# Patient Record
Sex: FEMALE | Race: WHITE | NOT HISPANIC OR LATINO | Employment: OTHER | URBAN - NONMETROPOLITAN AREA
[De-identification: names, ages, dates, MRNs, and addresses within clinical notes are randomized per-mention and may not be internally consistent; named-entity substitution may affect disease eponyms.]

---

## 2017-03-01 ENCOUNTER — LAB (OUTPATIENT)
Dept: LAB | Facility: HOSPITAL | Age: 62
End: 2017-03-01
Attending: OTOLARYNGOLOGY

## 2017-03-01 DIAGNOSIS — E04.1 THYROID NODULE: ICD-10-CM

## 2017-03-01 LAB — TSH SERPL DL<=0.05 MIU/L-ACNC: 2.86 MIU/ML (ref 0.47–4.68)

## 2017-03-01 PROCEDURE — 36415 COLL VENOUS BLD VENIPUNCTURE: CPT

## 2017-03-01 PROCEDURE — 84443 ASSAY THYROID STIM HORMONE: CPT | Performed by: OTOLARYNGOLOGY

## 2017-03-09 ENCOUNTER — OFFICE VISIT (OUTPATIENT)
Dept: OTOLARYNGOLOGY | Facility: CLINIC | Age: 62
End: 2017-03-09

## 2017-03-09 VITALS
HEART RATE: 100 BPM | WEIGHT: 293 LBS | RESPIRATION RATE: 20 BRPM | SYSTOLIC BLOOD PRESSURE: 160 MMHG | DIASTOLIC BLOOD PRESSURE: 86 MMHG | BODY MASS INDEX: 44.41 KG/M2 | TEMPERATURE: 97.9 F | HEIGHT: 68 IN

## 2017-03-09 DIAGNOSIS — E04.1 THYROID NODULE: Primary | ICD-10-CM

## 2017-03-09 DIAGNOSIS — E04.2 MULTINODULAR GOITER: ICD-10-CM

## 2017-03-09 PROCEDURE — 99214 OFFICE O/P EST MOD 30 MIN: CPT | Performed by: PHYSICIAN ASSISTANT

## 2017-03-09 RX ORDER — ASPIRIN 325 MG
325 TABLET ORAL DAILY
COMMUNITY

## 2017-03-09 RX ORDER — IBUPROFEN 800 MG/1
TABLET ORAL
Refills: 3 | COMMUNITY
Start: 2016-12-16

## 2017-03-09 NOTE — PROGRESS NOTES
Patient Care Team:  Wyatt Simmons MD as PCP - General  Wyatt Simmons MD as PCP - Family Medicine  Andrez Barreto MD as Consulting Physician (Otolaryngology)    Chief complaint : Follow up thyroid problems    Subjective     Nicole Song is a 61 y.o. female who presents for follow up of thyroid problems. She has had no current complaints. She denies throat pain, globus sensation, voice change or dysphagia.    Review of Systems  Review of Systems   Constitutional: Negative for activity change, appetite change, chills, diaphoresis, fatigue, fever and unexpected weight change.   HENT: Negative for congestion, dental problem, drooling, ear discharge, ear pain, facial swelling, hearing loss, mouth sores, nosebleeds, postnasal drip, rhinorrhea, sinus pressure, sneezing, sore throat, tinnitus, trouble swallowing and voice change.    Eyes: Negative.    Respiratory: Negative.    Cardiovascular: Negative.    Gastrointestinal: Negative.    Endocrine: Negative.    Skin: Negative.    Allergic/Immunologic: Negative for environmental allergies, food allergies and immunocompromised state.   Neurological: Negative.    Hematological: Negative.    Psychiatric/Behavioral: Negative.        History  Past Medical History   Diagnosis Date   • Asthma    • DVT (deep venous thrombosis)      Ankle and heart   • Thyroid nodule      Uncertain Behavior     Past Surgical History   Procedure Laterality Date   • Foot surgery     • Vitrectomy     • Eye surgery       Family History   Problem Relation Age of Onset   • Cancer Other      Family History of Breast Cancer     Social History   Substance Use Topics   • Smoking status: Never Smoker   • Smokeless tobacco: None   • Alcohol use No       Current Outpatient Prescriptions:   •  aspirin 325 MG tablet, Take 325 mg by mouth Daily., Disp: , Rfl:   •  ibuprofen (ADVIL,MOTRIN) 800 MG tablet, TAKE 1 TABLET BY MOUTH EVERY 6 HOURS AS NEEDED FOR PAIN, Disp: , Rfl: 3  Allergies:   Sulfa antibiotics; Pineapple; Citric acid; Fluocinolone; and Other    Objective     Vital Signs  Temp:  [97.9 °F (36.6 °C)] 97.9 °F (36.6 °C)  Heart Rate:  [100] 100  Resp:  [20] 20  BP: (160)/(86) 160/86    Physical Exam:  CONSTITUTIONAL: well nourished, well-developed, alert, oriented, in no acute distress   COMMUNICATION AND VOICE: able to communicate normally, normal voice quality  HEAD: normocephalic, no lesions, atraumatic, no tenderness, no masses   FACE: appearance normal, no lesions, no tenderness, no deformities, facial motion symmetric  EYES: ocular motility normal, eyelids normal, orbits normal, no proptosis, conjunctiva normal , pupils equal, round   EARS:  Hearing: hearing to conversational voice intact bilaterally   External Ears: normal bilaterally, no lesions  NOSE:  External Nose: external nasal structure normal, no tenderness on palpation, no nasal discharge, no lesions, no evidence of trauma, nostrils patent   ORAL:  Lips: upper and lower lips without lesion   NECK:  Inspection and Palpation: neck appearance normal, no masses or tenderness  THYROID: thyromegaly present with right inferior lobe firmness with large stable nodule noted on ultrasound, nontender and midline  CHEST/RESPIRATORY: normal respiratory effort   CARDIOVASCULAR: no cyanosis or edema   NEUROLOGICAL/PSYCHIATRIC: oriented to time, place and person, mood normal, affect appropriate, CN II-XII intact grossly    THYROID TESTING: TSH was normal. Thyroid ultrasound showed stable nodules.    TSH 0.470 - 4.680 mIU/mL 2.860         Assessment   1. Thyroid nodule    2. Multinodular goiter        Plan   Medical and surgical options were discussed including observation vs ultrasound guided needle biopsy vs thyroidectomy. Risks, benefits and alternatives were discussed and questions were answered. After considering the options, the patient decided to proceed continued observation.     --------TESTING:--------  TSH (57737) in 1 year  Repeat  ultrasound of the thyroid in 1 year    ----INSTRUCTIONS----  Call for sooner evaluation if increasing size of the thyroid or nodules, increasing dysphagia, lymphadenopathy, neck tightness or other thyroid problems.     -----FOLLOW UP-----  Follow up in 1 year    SCOUT Wilson  03/09/17  10:19 AM

## 2017-03-09 NOTE — PROGRESS NOTES
YOB: 1955  Location: [facility]  Location Address: [address] [place] [address], [facility], [address] [place] [address]  Location Phone: [phone]    Chief Complaint   Patient presents with   • Follow-up     Thyroid       History of Present Illness  Nicole Song is a 60 y.o. female. Nicole Song is here for follow up of ENT complaints. The patient has had problems with thyroid nodules The symptoms are not localized to a particular location. The patient has had stable symptoms. The symptoms have been present for the last several months .. Patient denies dysphagia today but other symptoms are stable. She has had recent abdominal surgery. Patient's main complaint today is ear pain and pressure on the right.           TSH 2.23 -16                 TSH 2.860           Past Medical History   Diagnosis Date   • Asthma    • DVT (deep venous thrombosis)      Ankle and heart   • Thyroid nodule      Uncertain Behavior       Past Surgical History   Procedure Laterality Date   • Foot surgery     • Vitrectomy     • Eye surgery           Current Outpatient Prescriptions:   •  aspirin 325 MG tablet, Take 325 mg by mouth Daily., Disp: , Rfl:   •  ibuprofen (ADVIL,MOTRIN) 800 MG tablet, TAKE 1 TABLET BY MOUTH EVERY 6 HOURS AS NEEDED FOR PAIN, Disp: , Rfl: 3    Sulfa antibiotics; Pineapple; Citric acid; Fluocinolone; and Other    Family History   Problem Relation Age of Onset   • Cancer Other      Family History of Breast Cancer       Social History     Social History   • Marital status: Single     Spouse name: N/A   • Number of children: N/A   • Years of education: N/A     Occupational History   • Not on file.     Social History Main Topics   • Smoking status: Never Smoker   • Smokeless tobacco: Not on file   • Alcohol use No   • Drug use: Not on file   • Sexual activity: Not on file     Other Topics Concern   • Not on file     Social History Narrative       Review of Systems    Vitals:    17 1000   BP: 160/86      Pulse: 100   Resp: 20   Temp: 97.9 °F (36.6 °C)       Objective     Physical Exam  CONSTITUTIONAL: well nourished, alert, oriented, in no acute distress     COMMUNICATION AND VOICE: able to communicate normally, normal voice quality    HEAD: normocephalic, no lesions, atraumatic, no tenderness, no masses     FACE: appearance normal, no lesions, no tenderness, no deformities, facial motion symmetric    SALIVARY GLANDS: parotid glands with no tenderness, no swelling, no masses, submandibular glands with normal size, nontender    EYES: ocular motility normal, eyelids normal, orbits normal, no proptosis, conjunctiva normal , pupils equal, round     EARS:  Hearing: response to conversational voice normal bilaterally   External Ears: auricles without lesions  Otoscopic: tympanic membrane appearance normal, no lesions, no perforation, normal mobility, no fluid    NOSE:  External Nose: structure normal, no tenderness on palpation, no nasal discharge, no lesions, no evidence of trauma, nostrils patent   Intranasal Exam: nasal mucosa normal, vestibule within normal limits, inferior turbinate normal, nasal septum midline   Nasopharynx:     ORAL:  Lips: upper and lower lips without lesion   Teeth: dentition within normal limits for age   Gums: gingivae healthy   Oral Mucosa: oral mucosa normal, no mucosal lesions   Floor of Mouth: Warthin’s duct patent, mucosa normal  Tongue: lingual mucosa normal without lesions, normal tongue mobility   Palate: soft and hard palates with normal mucosa and structure  Oropharynx: oropharyngeal mucosa normal    HYPOPHARYNX:   LARYNX: epiglottis and arytenoid cartilage within normal limits, vocal cord mucosa normal with normal mobility     NECK: neck appearance normal, no mass,  noted without erythema or tenderness    THYROID: no overt thyromegaly, no tenderness, nodules or mass present on palpation, position midline     LYMPH NODES: no lymphadenopathy    CHEST/RESPIRATORY: respiratory effort  normal, normal breath sounds     CARDIOVASCULAR: rate and rhythm normal, extremities without cyanosis or edema      NEUROLOGIC/PSYCHIATRIC: oriented to time, place and person, mood normal, affect appropriate, CN II-XII intact grossly    Assessment/Plan   {Assess/PlanSmartLinks:45395}  * Surgery not found *  No orders of the defined types were placed in this encounter.    No Follow-up on file.       There are no Patient Instructions on file for this visit.

## 2017-05-01 ENCOUNTER — TELEPHONE (OUTPATIENT)
Dept: OBGYN | Age: 62
End: 2017-05-01

## 2017-05-24 ENCOUNTER — OFFICE VISIT (OUTPATIENT)
Dept: OBGYN | Age: 62
End: 2017-05-24
Payer: COMMERCIAL

## 2017-05-24 VITALS
HEART RATE: 101 BPM | HEIGHT: 68 IN | DIASTOLIC BLOOD PRESSURE: 82 MMHG | WEIGHT: 293 LBS | BODY MASS INDEX: 44.41 KG/M2 | SYSTOLIC BLOOD PRESSURE: 157 MMHG

## 2017-05-24 DIAGNOSIS — B37.2 CANDIDAL INTERTRIGO: ICD-10-CM

## 2017-05-24 DIAGNOSIS — Z12.72 SCREENING FOR VAGINAL CANCER: ICD-10-CM

## 2017-05-24 DIAGNOSIS — Z01.419 WELL WOMAN EXAM WITH ROUTINE GYNECOLOGICAL EXAM: Primary | ICD-10-CM

## 2017-05-24 PROCEDURE — 99396 PREV VISIT EST AGE 40-64: CPT | Performed by: OBSTETRICS & GYNECOLOGY

## 2017-05-24 RX ORDER — NYSTATIN 100000 U/G
CREAM TOPICAL
Qty: 1 TUBE | Refills: 0 | Status: SHIPPED | OUTPATIENT
Start: 2017-05-24 | End: 2019-03-08

## 2017-05-24 RX ORDER — FLUCONAZOLE 100 MG/1
100 TABLET ORAL ONCE
Qty: 1 TABLET | Refills: 0 | Status: SHIPPED | OUTPATIENT
Start: 2017-05-24 | End: 2017-05-24

## 2017-05-24 ASSESSMENT — ENCOUNTER SYMPTOMS
EYES NEGATIVE: 1
RESPIRATORY NEGATIVE: 1
GASTROINTESTINAL NEGATIVE: 1

## 2017-05-28 ASSESSMENT — ENCOUNTER SYMPTOMS: ALLERGIC/IMMUNOLOGIC NEGATIVE: 1

## 2018-02-27 ENCOUNTER — LAB (OUTPATIENT)
Dept: LAB | Facility: HOSPITAL | Age: 63
End: 2018-02-27

## 2018-02-27 DIAGNOSIS — E04.1 THYROID NODULE: ICD-10-CM

## 2018-02-27 LAB — TSH SERPL DL<=0.05 MIU/L-ACNC: 1.31 MIU/ML (ref 0.47–4.68)

## 2018-02-27 PROCEDURE — 36415 COLL VENOUS BLD VENIPUNCTURE: CPT

## 2018-02-27 PROCEDURE — 84443 ASSAY THYROID STIM HORMONE: CPT | Performed by: PHYSICIAN ASSISTANT

## 2018-03-08 ENCOUNTER — OFFICE VISIT (OUTPATIENT)
Dept: OTOLARYNGOLOGY | Facility: CLINIC | Age: 63
End: 2018-03-08

## 2018-03-08 ENCOUNTER — CLINICAL SUPPORT NO REQUIREMENTS (OUTPATIENT)
Dept: OTOLARYNGOLOGY | Facility: CLINIC | Age: 63
End: 2018-03-08

## 2018-03-08 VITALS
BODY MASS INDEX: 44.41 KG/M2 | SYSTOLIC BLOOD PRESSURE: 150 MMHG | TEMPERATURE: 98.3 F | WEIGHT: 293 LBS | DIASTOLIC BLOOD PRESSURE: 90 MMHG | HEIGHT: 68 IN

## 2018-03-08 DIAGNOSIS — E04.1 THYROID NODULE: ICD-10-CM

## 2018-03-08 DIAGNOSIS — E04.2 MULTINODULAR GOITER: Primary | ICD-10-CM

## 2018-03-08 PROCEDURE — 99213 OFFICE O/P EST LOW 20 MIN: CPT | Performed by: PHYSICIAN ASSISTANT

## 2018-03-08 PROCEDURE — 76536 US EXAM OF HEAD AND NECK: CPT | Performed by: PHYSICIAN ASSISTANT

## 2018-03-08 NOTE — PROGRESS NOTES
Patient Care Team:  Wyatt Simmons MD as PCP - General  Wyatt Simmons MD as PCP - Family Medicine  Andrez Barreto MD as Consulting Physician (Otolaryngology)    Chief complaint : Follow up thyroid problems    Subjective     Nicole Song is a 62 y.o. female who presents for follow up of thyroid problems. She has had no current complaints. She denies throat pain, globus sensation, voice change or dysphagia.    Review of Systems  Review of Systems   Constitutional: Negative for activity change, appetite change, chills, diaphoresis, fatigue, fever and unexpected weight change.   HENT: Negative for congestion, dental problem, drooling, ear discharge, ear pain, facial swelling, hearing loss, mouth sores, nosebleeds, postnasal drip, rhinorrhea, sinus pressure, sneezing, sore throat, tinnitus, trouble swallowing and voice change.    Eyes: Negative.    Respiratory: Negative.    Cardiovascular: Negative.    Gastrointestinal: Negative.    Endocrine: Negative.    Skin: Negative.    Allergic/Immunologic: Negative for environmental allergies, food allergies and immunocompromised state.   Neurological: Negative.    Hematological: Negative.    Psychiatric/Behavioral: Negative.        History  Past Medical History:   Diagnosis Date   • Asthma    • DVT (deep venous thrombosis)     Ankle and heart   • Thyroid nodule     Uncertain Behavior     Past Surgical History:   Procedure Laterality Date   • EYE SURGERY     • FOOT SURGERY     • VITRECTOMY       Family History   Problem Relation Age of Onset   • Cancer Other      Family History of Breast Cancer     Social History   Substance Use Topics   • Smoking status: Never Smoker   • Smokeless tobacco: Never Used   • Alcohol use No       Current Outpatient Prescriptions:   •  aspirin 325 MG tablet, Take 325 mg by mouth Daily., Disp: , Rfl:   •  ibuprofen (ADVIL,MOTRIN) 800 MG tablet, TAKE 1 TABLET BY MOUTH EVERY 6 HOURS AS NEEDED FOR PAIN, Disp: , Rfl: 3  Allergies:   Sulfa antibiotics; Pineapple; Citric acid; Fluocinolone; and Other    Objective     Vital Signs  Temp:  [98.3 °F (36.8 °C)] 98.3 °F (36.8 °C)  BP: (150)/(90) 150/90    Physical Exam:  CONSTITUTIONAL: well nourished, well-developed, alert, oriented, in no acute distress   COMMUNICATION AND VOICE: able to communicate normally, normal voice quality  HEAD: normocephalic, no lesions, atraumatic, no tenderness, no masses   FACE: appearance normal, no lesions, no tenderness, no deformities, facial motion symmetric  EYES: ocular motility normal, eyelids normal, orbits normal, no proptosis, conjunctiva normal , pupils equal, round   EARS:  Hearing: hearing to conversational voice intact bilaterally   External Ears: normal bilaterally, no lesions  NOSE:  External Nose: external nasal structure normal, no tenderness on palpation, no nasal discharge, no lesions, no evidence of trauma, nostrils patent   ORAL:  Lips: upper and lower lips without lesion   NECK:  Inspection and Palpation: neck appearance normal, no masses or tenderness  THYROID: thyromegaly present with right inferior lobe firmness with large stable nodule noted on ultrasound, nontender and midline  CHEST/RESPIRATORY: normal respiratory effort   CARDIOVASCULAR: no cyanosis or edema   NEUROLOGICAL/PSYCHIATRIC: oriented to time, place and person, mood normal, affect appropriate, CN II-XII intact grossly    THYROID TESTING: TSH was normal. Thyroid ultrasound showed stable nodules.        TSH 2-27-18 1.310    Assessment   1. Multinodular goiter        Plan   Medical and surgical options were discussed including observation vs ultrasound guided needle biopsy vs thyroidectomy. Risks, benefits and alternatives were discussed and questions were answered. After considering the options, the patient decided to proceed continued observation.     --------TESTING:--------  TSH (78872) in 1 year  Repeat ultrasound of the thyroid in 1 year    ----INSTRUCTIONS----  Call for sooner  evaluation if increasing size of the thyroid or nodules, increasing dysphagia, lymphadenopathy, neck tightness or other thyroid problems.     -----FOLLOW UP-----  Follow up in 1 year    SCOUT Wilson  03/08/18  10:54 AM

## 2018-03-08 NOTE — PATIENT INSTRUCTIONS
Will get thyroid ultrasound and TSH in one year. Follow-up sooner if symptoms develop.      MyPlate from [employer]  The general, healthful diet is based on the 2010 Dietary Guidelines for Americans. The amount of food you need to eat from each food group depends on your age, sex, and level of physical activity and can be individualized by a dietitian. Go to ChooseMyPlate.gov for more information.  What do I need to know about the MyPlate plan?  · Enjoy your food, but eat less.  · Avoid oversized portions.  ¨ ½ of your plate should include fruits and vegetables.  ¨ ¼ of your plate should be grains.  ¨ ¼ of your plate should be protein.  Grains   · Make at least half of your grains whole grains.  · For a 2,000 calorie daily food plan, eat 6 oz every day.  · 1 oz is about 1 slice bread, 1 cup cereal, or ½ cup cooked rice, cereal, or pasta.  Vegetables   · Make half your plate fruits and vegetables.  · For a 2,000 calorie daily food plan, eat 2½ cups every day.  · 1 cup is about 1 cup raw or cooked vegetables or vegetable juice or 2 cups raw leafy greens.  Fruits   · Make half your plate fruits and vegetables.  · For a 2,000 calorie daily food plan, eat 2 cups every day.  · 1 cup is about 1 cup fruit or 100% fruit juice or ½ cup dried fruit.  Protein   · For a 2,000 calorie daily food plan, eat 5½ oz every day.  · 1 oz is about 1 oz meat, poultry, or fish, ¼ cup cooked beans, 1 egg, 1 Tbsp peanut butter, or ½ oz nuts or seeds.  Dairy   · Switch to fat-free or low-fat (1%) milk.  · For a 2,000 calorie daily food plan, eat 3 cups every day.  · 1 cup is about 1 cup milk or yogurt or soy milk (soy beverage), 1½ oz natural cheese, or 2 oz processed cheese.  Fats, Oils, and Empty Calories   · Only small amounts of oils are recommended.  · Empty calories are calories from solid fats or added sugars.  · Compare sodium in foods like soup, bread, and frozen meals. Choose the foods with lower numbers.  · Drink water instead of sugary  drinks.  What foods can I eat?  Grains   Whole grains such as whole wheat, quinoa, millet, and bulgur. Bread, rolls, and pasta made from whole grains. Brown or wild rice. Hot or cold cereals made from whole grains and without added sugar.  Vegetables   All fresh vegetables, especially fresh red, dark green, or orange vegetables. Peas and beans. Low-sodium frozen or canned vegetables prepared without added salt. Low-sodium vegetable juices.  Fruits   All fresh, frozen, and dried fruits. Canned fruit packed in water or fruit juice without added sugar. Fruit juices without added sugar.  Meats and Other Protein Sources   Boiled, baked, or grilled lean meat trimmed of fat. Skinless poultry. Fresh seafood and shellfish. Canned seafood packed in water. Unsalted nuts and unsalted nut butters. Tofu. Dried beans and pea. Eggs.  Dairy   Low-fat or fat-free milk, yogurt, and cheeses.  Sweets and Desserts   Frozen desserts made from low-fat milk.  Fats and Oils   Olive, peanut, and canola oils and margarine. Salad dressing and mayonnaise made from these oils.  Other   Soups and casseroles made from allowed ingredients and without added fat or salt.  The items listed above may not be a complete list of recommended foods or beverages. Contact your dietitian for more options.   What foods are not recommended?  Grains   Sweetened, low-fiber cereals. Packaged baked goods. Snack crackers and chips. Cheese crackers, butter crackers, and biscuits. Frozen waffles, sweet breads, doughnuts, pastries, packaged baking mixes, pancakes, cakes, and cookies.  Vegetables   Regular canned or frozen vegetables or vegetables prepared with salt. Canned tomatoes. Canned tomato sauce. Fried vegetables. Vegetables in cream sauce or cheese sauce.  Fruits   Fruits packed in syrup or made with added sugar.  Meats and Other Protein Sources   Marbled or fatty meats such as ribs. Poultry with skin. Fried meats, poultry, eggs, or fish. Sausages, hot dogs, and  deli meats such as pastrami, bologna, or salami.  Dairy   Whole milk, cream, cheeses made from whole milk, sour cream. Ice cream or yogurt made from whole milk or with added sugar.  Beverages   For adults, no more than one alcoholic drink per day. Regular soft drinks or other sugary beverages. Juice drinks.  Sweets and Desserts   Sugary or fatty desserts, candy, and other sweets.  Fats and Oils   Solid shortening or partially hydrogenated oils. Solid margarine. Margarine that contains trans fats. Butter.  The items listed above may not be a complete list of foods and beverages to avoid. Contact your dietitian for more information.   This information is not intended to replace advice given to you by your health care provider. Make sure you discuss any questions you have with your health care provider.  Document Released: 01/06/2009 Document Revised: 05/25/2017 Document Reviewed: 11/26/2014  [employer] Interactive Patient Education © 2017 [employer]     Calorie Counting for Weight Loss  Calories are units of energy. Your body needs a certain amount of calories from food to keep you going throughout the day. When you eat more calories than your body needs, your body stores the extra calories as fat. When you eat fewer calories than your body needs, your body burns fat to get the energy it needs.  Calorie counting means keeping track of how many calories you eat and drink each day. Calorie counting can be helpful if you need to lose weight. If you make sure to eat fewer calories than your body needs, you should lose weight. Ask your health care provider what a healthy weight is for you.  For calorie counting to work, you will need to eat the right number of calories in a day in order to lose a healthy amount of weight per week. A dietitian can help you determine how many calories you need in a day and will give you suggestions on how to reach your calorie goal.  · A healthy amount of weight to lose per week is usually  1-2 lb (0.5-0.9 kg). This usually means that your daily calorie intake should be reduced by 500-750 calories.  · Eating 1,200 - 1,500 calories per day can help most women lose weight.  · Eating 1,500 - 1,800 calories per day can help most men lose weight.  What is my plan?  My goal is to have __________ calories per day.  If I have this many calories per day, I should lose around __________ pounds per week.  What do I need to know about calorie counting?  In order to meet your daily calorie goal, you will need to:  · Find out how many calories are in each food you would like to eat. Try to do this before you eat.  · Decide how much of the food you plan to eat.  · Write down what you ate and how many calories it had. Doing this is called keeping a food log.  To successfully lose weight, it is important to balance calorie counting with a healthy lifestyle that includes regular activity. Aim for 150 minutes of moderate exercise (such as walking) or 75 minutes of vigorous exercise (such as running) each week.  Where do I find calorie information?     The number of calories in a food can be found on a Nutrition Facts label. If a food does not have a Nutrition Facts label, try to look up the calories online or ask your dietitian for help.  Remember that calories are listed per serving. If you choose to have more than one serving of a food, you will have to multiply the calories per serving by the amount of servings you plan to eat. For example, the label on a package of bread might say that a serving size is 1 slice and that there are 90 calories in a serving. If you eat 1 slice, you will have eaten 90 calories. If you eat 2 slices, you will have eaten 180 calories.  How do I keep a food log?  Immediately after each meal, record the following information in your food log:  · What you ate. Don't forget to include toppings, sauces, and other extras on the food.  · How much you ate. This can be measured in cups, ounces, or  "number of items.  · How many calories each food and drink had.  · The total number of calories in the meal.  Keep your food log near you, such as in a small notebook in your pocket, or use a mobile kacie or website. Some programs will calculate calories for you and show you how many calories you have left for the day to meet your goal.  What are some calorie counting tips?  · Use your calories on foods and drinks that will fill you up and not leave you hungry:  ¨ Some examples of foods that fill you up are nuts and nut butters, vegetables, lean proteins, and high-fiber foods like whole grains. High-fiber foods are foods with more than 5 g fiber per serving.  ¨ Drinks such as sodas, specialty coffee drinks, alcohol, and juices have a lot of calories, yet do not fill you up.  · Eat nutritious foods and avoid empty calories. Empty calories are calories you get from foods or beverages that do not have many vitamins or protein, such as candy, sweets, and soda. It is better to have a nutritious high-calorie food (such as an avocado) than a food with few nutrients (such as a bag of chips).  · Know how many calories are in the foods you eat most often. This will help you calculate calorie counts faster.  · Pay attention to calories in drinks. Low-calorie drinks include water and unsweetened drinks.  · Pay attention to nutrition labels for \"low fat\" or \"fat free\" foods. These foods sometimes have the same amount of calories or more calories than the full fat versions. They also often have added sugar, starch, or salt, to make up for flavor that was removed with the fat.  · Find a way of tracking calories that works for you. Get creative. Try different apps or programs if writing down calories does not work for you.  What are some portion control tips?  · Know how many calories are in a serving. This will help you know how many servings of a certain food you can have.  · Use a measuring cup to measure serving sizes. You could " also try weighing out portions on a kitchen scale. With time, you will be able to estimate serving sizes for some foods.  · Take some time to put servings of different foods on your favorite plates, bowls, and cups so you know what a serving looks like.  · Try not to eat straight from a bag or box. Doing this can lead to overeating. Put the amount you would like to eat in a cup or on a plate to make sure you are eating the right portion.  · Use smaller plates, glasses, and bowls to prevent overeating.  · Try not to multitask (for example, watch TV or use your computer) while eating. If it is time to eat, sit down at a table and enjoy your food. This will help you to know when you are full. It will also help you to be aware of what you are eating and how much you are eating.  What are tips for following this plan?  Reading food labels   · Check the calorie count compared to the serving size. The serving size may be smaller than what you are used to eating.  · Check the source of the calories. Make sure the food you are eating is high in vitamins and protein and low in saturated and trans fats.  Shopping   · Read nutrition labels while you shop. This will help you make healthy decisions before you decide to purchase your food.  · Make a grocery list and stick to it.  Cooking   · Try to cook your favorite foods in a healthier way. For example, try baking instead of frying.  · Use low-fat dairy products.  Meal planning   · Use more fruits and vegetables. Half of your plate should be fruits and vegetables.  · Include lean proteins like poultry and fish.  How do I count calories when eating out?  · Ask for smaller portion sizes.  · Consider sharing an entree and sides instead of getting your own entree.  · If you get your own entree, eat only half. Ask for a box at the beginning of your meal and put the rest of your entree in it so you are not tempted to eat it.  · If calories are listed on the menu, choose the lower  calorie options.  · Choose dishes that include vegetables, fruits, whole grains, low-fat dairy products, and lean protein.  · Choose items that are boiled, broiled, grilled, or steamed. Stay away from items that are buttered, battered, fried, or served with cream sauce. Items labeled “crispy” are usually fried, unless stated otherwise.  · Choose water, low-fat milk, unsweetened iced tea, or other drinks without added sugar. If you want an alcoholic beverage, choose a lower calorie option such as a glass of wine or light beer.  · Ask for dressings, sauces, and syrups on the side. These are usually high in calories, so you should limit the amount you eat.  · If you want a salad, choose a garden salad and ask for grilled meats. Avoid extra toppings like cisneros, cheese, or fried items. Ask for the dressing on the side, or ask for olive oil and vinegar or lemon to use as dressing.  · Estimate how many servings of a food you are given. For example, a serving of cooked rice is ½ cup or about the size of half a baseball. Knowing serving sizes will help you be aware of how much food you are eating at restaurants. The list below tells you how big or small some common portion sizes are based on everyday objects:  ¨ 1 oz--4 stacked dice.  ¨ 3 oz--1 deck of cards.  ¨ 1 tsp--1 die.  ¨ 1 Tbsp--½ a ping-pong ball.  ¨ 2 Tbsp--1 ping-pong ball.  ¨ ½ cup--½ baseball.  ¨ 1 cup--1 baseball.  Summary  · Calorie counting means keeping track of how many calories you eat and drink each day. If you eat fewer calories than your body needs, you should lose weight.  · A healthy amount of weight to lose per week is usually 1-2 lb (0.5-0.9 kg). This usually means reducing your daily calorie intake by 500-750 calories.  · The number of calories in a food can be found on a Nutrition Facts label. If a food does not have a Nutrition Facts label, try to look up the calories online or ask your dietitian for help.  · Use your calories on foods and drinks  that will fill you up, and not on foods and drinks that will leave you hungry.  · Use smaller plates, glasses, and bowls to prevent overeating.  This information is not intended to replace advice given to you by your health care provider. Make sure you discuss any questions you have with your health care provider.  Document Released: 12/18/2006 Document Revised: 11/17/2017 Document Reviewed: 11/17/2017  [employer] Interactive Patient Education © 2017 [employer]     Exercising to Lose Weight  Exercising can help you to lose weight. In order to lose weight through exercise, you need to do vigorous-intensity exercise. You can tell that you are exercising with vigorous intensity if you are breathing very hard and fast and cannot hold a conversation while exercising.  Moderate-intensity exercise helps to maintain your current weight. You can tell that you are exercising at a moderate level if you have a higher heart rate and faster breathing, but you are still able to hold a conversation.  How often should I exercise?  Choose an activity that you enjoy and set realistic goals. Your health care provider can help you to make an activity plan that works for you. Exercise regularly as directed by your health care provider. This may include:  · Doing resistance training twice each week, such as:  ¨ Push-ups.  ¨ Sit-ups.  ¨ Lifting weights.  ¨ Using resistance bands.  · Doing a given intensity of exercise for a given amount of time. Choose from these options:  ¨ 150 minutes of moderate-intensity exercise every week.  ¨ 75 minutes of vigorous-intensity exercise every week.  ¨ A mix of moderate-intensity and vigorous-intensity exercise every week.  Children, pregnant women, people who are out of shape, people who are overweight, and older adults may need to consult a health care provider for individual recommendations. If you have any sort of medical condition, be sure to consult your health care provider before starting a new  exercise program.  What are some activities that can help me to lose weight?  · Walking at a rate of at least 4.5 miles an hour.  · Jogging or running at a rate of 5 miles per hour.  · Biking at a rate of at least 10 miles per hour.  · Lap swimming.  · Roller-skating or in-line skating.  · Cross-country skiing.  · Vigorous competitive sports, such as football, basketball, and soccer.  · Jumping rope.  · Aerobic dancing.  How can I be more active in my day-to-day activities?  · Use the stairs instead of the elevator.  · Take a walk during your lunch break.  · If you drive, park your car farther away from work or school.  · If you take public transportation, get off one stop early and walk the rest of the way.  · Make all of your phone calls while standing up and walking around.  · Get up, stretch, and walk around every 30 minutes throughout the day.  What guidelines should I follow while exercising?  · Do not exercise so much that you hurt yourself, feel dizzy, or get very short of breath.  · Consult your health care provider prior to starting a new exercise program.  · Wear comfortable clothes and shoes with good support.  · Drink plenty of water while you exercise to prevent dehydration or heat stroke. Body water is lost during exercise and must be replaced.  · Work out until you breathe faster and your heart beats faster.  This information is not intended to replace advice given to you by your health care provider. Make sure you discuss any questions you have with your health care provider.  Document Released: 01/20/2012 Document Revised: 05/25/2017 Document Reviewed: 05/21/2015  [employer] Interactive Patient Education © 2017 [employer]

## 2018-08-17 ENCOUNTER — OFFICE VISIT (OUTPATIENT)
Dept: FAMILY MEDICINE CLINIC | Age: 63
End: 2018-08-17
Payer: COMMERCIAL

## 2018-08-17 VITALS
OXYGEN SATURATION: 96 % | BODY MASS INDEX: 44.41 KG/M2 | HEART RATE: 108 BPM | SYSTOLIC BLOOD PRESSURE: 162 MMHG | DIASTOLIC BLOOD PRESSURE: 82 MMHG | WEIGHT: 293 LBS | TEMPERATURE: 97.7 F | RESPIRATION RATE: 22 BRPM | HEIGHT: 68 IN

## 2018-08-17 DIAGNOSIS — L03.031 PARONYCHIA OF GREAT TOE, RIGHT: ICD-10-CM

## 2018-08-17 DIAGNOSIS — L03.031 PARONYCHIA OF GREAT TOE, RIGHT: Primary | ICD-10-CM

## 2018-08-17 DIAGNOSIS — L03.031 CELLULITIS OF TOE OF RIGHT FOOT: ICD-10-CM

## 2018-08-17 DIAGNOSIS — R03.0 ELEVATED BLOOD PRESSURE READING: ICD-10-CM

## 2018-08-17 PROCEDURE — 99214 OFFICE O/P EST MOD 30 MIN: CPT | Performed by: NURSE PRACTITIONER

## 2018-08-17 RX ORDER — CEPHALEXIN 500 MG/1
500 CAPSULE ORAL 4 TIMES DAILY
Qty: 40 CAPSULE | Refills: 0 | Status: SHIPPED | OUTPATIENT
Start: 2018-08-17 | End: 2018-08-21 | Stop reason: ALTCHOICE

## 2018-08-17 ASSESSMENT — PATIENT HEALTH QUESTIONNAIRE - PHQ9
SUM OF ALL RESPONSES TO PHQ QUESTIONS 1-9: 0
SUM OF ALL RESPONSES TO PHQ QUESTIONS 1-9: 0
SUM OF ALL RESPONSES TO PHQ9 QUESTIONS 1 & 2: 0
1. LITTLE INTEREST OR PLEASURE IN DOING THINGS: 0
2. FEELING DOWN, DEPRESSED OR HOPELESS: 0

## 2018-08-17 ASSESSMENT — ENCOUNTER SYMPTOMS
ABDOMINAL PAIN: 0
COLOR CHANGE: 1
DIARRHEA: 0
WHEEZING: 0
COUGH: 0
SHORTNESS OF BREATH: 0
CHEST TIGHTNESS: 0
NAUSEA: 0
SORE THROAT: 0

## 2018-08-17 NOTE — PROGRESS NOTES
Freida Gould is a 58 y.o. female who presents today for   Chief Complaint   Patient presents with    Nail Problem       HPI:  She has had progressive pain, swelling, and redness around the R great toenail for the past 4 days. She has had purulent drainage from under the nail intermittently. She states this started suddenly. No nail problems prior to this. No fever. She feels redness is moving up the toe to the anterior foot slightly. BP was elevated on arrival.  Rechecked was 162/82. She states she has white coat syndrome. She checks bp at home at least once a week and typically runs 130s/60s. She does not take bp medication. She mentions that she has had mild swelling of the L foot. She has had past LLE DVT and took xarelto for a period of time 2 years ago. No calf swelling or pain. Review of Systems   Constitutional: Negative for chills and fever. HENT: Negative for congestion, ear pain and sore throat. Respiratory: Negative for cough, chest tightness, shortness of breath and wheezing. Cardiovascular: Negative for chest pain. Gastrointestinal: Negative for abdominal pain, diarrhea and nausea. Musculoskeletal: Negative for arthralgias and myalgias. Skin: Positive for color change (redness R great toe; see HPI). Negative for rash. Past Medical History:   Diagnosis Date    Concussion     hx of; in high school    Left foot soft tissue tumor     Left leg DVT (HCC)     Ovarian cyst     Retinal tear     Right eye    Tumor, thyroid     Dr. Angela Copeland managing       Current Outpatient Prescriptions   Medication Sig Dispense Refill    cephALEXin (KEFLEX) 500 MG capsule Take 1 capsule by mouth 4 times daily for 10 days 40 capsule 0    mupirocin (BACTROBAN) 2 % ointment Apply topically 2 times daily. 15 g 0    nystatin (MYCOSTATIN) 716571 UNIT/GM cream Apply topically 2 times daily.  1 Tube 0    ibuprofen (ADVIL;MOTRIN) 800 MG tablet Take 1 tablet by mouth every 6 hours as needed for Pain 120 tablet 3     No current facility-administered medications for this visit. Allergies   Allergen Reactions    Pineapple Itching and Swelling    Ciprocinonide [Fluocinolone]     Citrus     Sulfur        Past Surgical History:   Procedure Laterality Date    EYE SURGERY      FOOT SURGERY      SHAZIA AND BSO N/A 12/30/2015    HYSTERECTOMY ABDOMINAL TOTAL BILATERAL SALPINGO-OOPHORECTOMY; EXAM UNDER ANESTHESIA; AND CYSTOSCOPY  performed by Antonio Beckham MD at [place] [address] History   Substance Use Topics    Smoking status: Never Smoker    Smokeless tobacco: Never Used    Alcohol use No       Family History   Problem Relation Age of Onset    Other Mother         pulmonary embolism       BP (!) 186/96   Pulse 108   Temp 97.7 °F (36.5 °C) (Temporal)   Resp 22   Ht 5' 8\" (1.727 m)   Wt (!) 320 lb 6 oz (145.3 kg)   SpO2 96%   BMI 48.71 kg/m²     Physical Exam   Constitutional: She appears well-developed and well-nourished. HENT:   Left Ear: External ear normal.   Eyes: Pupils are equal, round, and reactive to light. Conjunctivae and EOM are normal.   Cardiovascular: Normal rate, regular rhythm, normal heart sounds and intact distal pulses. No murmur heard. Pulmonary/Chest: Effort normal and breath sounds normal. No respiratory distress. Musculoskeletal: Normal range of motion. She exhibits no edema. Skin: Skin is warm and dry. No rash noted. There is erythema. R great toenail is opaque and slightly thickened. Surrounding erythema and mild swelling with mild tenderness. Scant purulent discharge is expressed by pressing on nail. Wound culture obtained. Psychiatric: She has a normal mood and affect. Vitals reviewed. Assessment:    ICD-10-CM ICD-9-CM    1. Paronychia of great toe, right L03.031 681.11 Wound Culture   2.  Elevated blood pressure reading R03.0 796.2        Plan:  -Wound culture obtained from R great toenail.  -Treat paronychia/cellulitis with cephalexin 500 mg qid x 10 days. -Mupirocin ointment bid. Apply around and under nail after soaking in epsom salts. -BP still elevated when rechecked but improved. She will continue to monitor at home which is controlled at home.  -She was advised to go to urgent care or ER over the weekend with any acute worsening to the R toe/foot or if her L foot swelling worsens. No appearance of DVT but can obtain venous scan if swelling worsens. Advised to elevate foot at home. -May need referral to podiatry for removal of toenail if not improving. -F/U on Tuesday to recheck toe/foot. Lew Vargas was seen today for nail problem. Diagnoses and all orders for this visit:    Paronychia of great toe, right  -     Wound Culture; Future    Elevated blood pressure reading    Other orders  -     cephALEXin (KEFLEX) 500 MG capsule; Take 1 capsule by mouth 4 times daily for 10 days  -     mupirocin (BACTROBAN) 2 % ointment; Apply topically 2 times daily. There are no discontinued medications. There are no Patient Instructions on file for this visit. Patient voices understanding and agrees to plans along with risks and benefits of plan. Counseling:  Ellen Villa's case, medications and options were discussed in detail. Patient was instructed to call the office if she questions regarding her treatment. Should her conditions worsen, she should return to office to be reassessed by ALFREDO Cardona. she Should to go the closest Emergency Department for any emergency. They verbalized understanding the above instructions. No Follow-up on file.

## 2018-08-21 ENCOUNTER — OFFICE VISIT (OUTPATIENT)
Dept: FAMILY MEDICINE CLINIC | Age: 63
End: 2018-08-21
Payer: COMMERCIAL

## 2018-08-21 VITALS
WEIGHT: 293 LBS | HEIGHT: 68 IN | BODY MASS INDEX: 44.41 KG/M2 | HEART RATE: 105 BPM | DIASTOLIC BLOOD PRESSURE: 86 MMHG | OXYGEN SATURATION: 96 % | SYSTOLIC BLOOD PRESSURE: 188 MMHG | TEMPERATURE: 97.6 F

## 2018-08-21 DIAGNOSIS — L03.031 CELLULITIS OF GREAT TOE OF RIGHT FOOT: ICD-10-CM

## 2018-08-21 DIAGNOSIS — R03.0 BLOOD PRESSURE ELEVATED WITHOUT HISTORY OF HTN: ICD-10-CM

## 2018-08-21 DIAGNOSIS — L60.3 DYSTROPHIC NAIL: ICD-10-CM

## 2018-08-21 DIAGNOSIS — L03.031 PARONYCHIA OF GREAT TOE, RIGHT: Primary | ICD-10-CM

## 2018-08-21 LAB
GRAM STAIN RESULT: ABNORMAL
ORGANISM: ABNORMAL
WOUND/ABSCESS: ABNORMAL

## 2018-08-21 PROCEDURE — 99213 OFFICE O/P EST LOW 20 MIN: CPT | Performed by: NURSE PRACTITIONER

## 2018-08-21 RX ORDER — CLINDAMYCIN HYDROCHLORIDE 300 MG/1
300 CAPSULE ORAL 3 TIMES DAILY
Qty: 30 CAPSULE | Refills: 0 | Status: SHIPPED | OUTPATIENT
Start: 2018-08-21 | End: 2018-08-31

## 2018-08-21 RX ORDER — AMOXICILLIN 500 MG/1
500 CAPSULE ORAL 3 TIMES DAILY
Qty: 30 CAPSULE | Refills: 0 | Status: SHIPPED | OUTPATIENT
Start: 2018-08-21 | End: 2018-08-31

## 2018-08-21 ASSESSMENT — ENCOUNTER SYMPTOMS
ABDOMINAL PAIN: 0
COLOR CHANGE: 1
COUGH: 0
SHORTNESS OF BREATH: 0
DIARRHEA: 0
NAUSEA: 0
VOMITING: 0

## 2018-08-21 NOTE — PROGRESS NOTES
10 days. Advised to take with food.  -Continue soaking the foot twice daily, mupirocin ointment bid.  -Discussed XR of foot to r/o bony involvement. She defers this today and would like to try the new meds. Advised that we will need to obtain imaging if not improving or with any worsening.  -Refer to podiatry as she will likely need removal of the nail.  -Declined bp medication and states she is checking it often at home and has been normal.  This is a chronic issue with bp elevated when here. -F/U 1 week. Elmon Lesch was seen today for follow-up. Diagnoses and all orders for this visit:    Paronychia of great toe, right    Cellulitis of great toe of right foot    Blood pressure elevated without history of HTN    Other orders  -     amoxicillin (AMOXIL) 500 MG capsule; Take 1 capsule by mouth 3 times daily for 10 days  -     clindamycin (CLEOCIN) 300 MG capsule; Take 1 capsule by mouth 3 times daily for 10 days      Medications Discontinued During This Encounter   Medication Reason    cephALEXin (KEFLEX) 500 MG capsule Alternate therapy     There are no Patient Instructions on file for this visit. Patient voices understanding and agrees to plans along with risks and benefits of plan. Counseling:  Ellen Villa's case, medications and options were discussed in detail. Patient was instructed to call the office if she questions regarding her treatment. Should her conditions worsen, she should return to office to be reassessed by ALFREDO Saxena. she Should to go the closest Emergency Department for any emergency. They verbalized understanding the above instructions. No Follow-up on file.

## 2018-08-28 ENCOUNTER — HOSPITAL ENCOUNTER (OUTPATIENT)
Dept: GENERAL RADIOLOGY | Age: 63
Discharge: HOME | End: 2018-08-28
Payer: COMMERCIAL

## 2018-08-28 ENCOUNTER — TELEPHONE (OUTPATIENT)
Dept: FAMILY MEDICINE CLINIC | Age: 63
End: 2018-08-28

## 2018-08-28 ENCOUNTER — OFFICE VISIT (OUTPATIENT)
Dept: FAMILY MEDICINE CLINIC | Age: 63
End: 2018-08-28
Payer: COMMERCIAL

## 2018-08-28 VITALS
TEMPERATURE: 97.9 F | HEIGHT: 68 IN | DIASTOLIC BLOOD PRESSURE: 82 MMHG | SYSTOLIC BLOOD PRESSURE: 160 MMHG | OXYGEN SATURATION: 97 % | HEART RATE: 91 BPM | WEIGHT: 293 LBS | BODY MASS INDEX: 44.41 KG/M2

## 2018-08-28 DIAGNOSIS — L03.031 CELLULITIS OF GREAT TOE OF RIGHT FOOT: Primary | ICD-10-CM

## 2018-08-28 DIAGNOSIS — M79.674 GREAT TOE PAIN, RIGHT: ICD-10-CM

## 2018-08-28 DIAGNOSIS — L03.031 PARONYCHIA OF GREAT TOE, RIGHT: ICD-10-CM

## 2018-08-28 DIAGNOSIS — L03.031 CELLULITIS OF GREAT TOE OF RIGHT FOOT: ICD-10-CM

## 2018-08-28 LAB
ALBUMIN SERPL-MCNC: 4.5 G/DL (ref 3.5–5.2)
ALP BLD-CCNC: 74 U/L (ref 35–104)
ALT SERPL-CCNC: 19 U/L (ref 5–33)
ANION GAP SERPL CALCULATED.3IONS-SCNC: 19 MMOL/L (ref 7–19)
AST SERPL-CCNC: 16 U/L (ref 5–32)
BASOPHILS ABSOLUTE: 0.1 K/UL (ref 0–0.2)
BASOPHILS RELATIVE PERCENT: 0.8 % (ref 0–1)
BILIRUB SERPL-MCNC: 0.4 MG/DL (ref 0.2–1.2)
BUN BLDV-MCNC: 13 MG/DL (ref 8–23)
CALCIUM SERPL-MCNC: 9.6 MG/DL (ref 8.8–10.2)
CHLORIDE BLD-SCNC: 103 MMOL/L (ref 98–111)
CO2: 22 MMOL/L (ref 22–29)
CREAT SERPL-MCNC: 0.7 MG/DL (ref 0.5–0.9)
EOSINOPHILS ABSOLUTE: 0.4 K/UL (ref 0–0.6)
EOSINOPHILS RELATIVE PERCENT: 5 % (ref 0–5)
GFR NON-AFRICAN AMERICAN: >60
GLUCOSE BLD-MCNC: 91 MG/DL (ref 74–109)
HCT VFR BLD CALC: 41.7 % (ref 37–47)
HEMOGLOBIN: 12.9 G/DL (ref 12–16)
LYMPHOCYTES ABSOLUTE: 1.9 K/UL (ref 1.1–4.5)
LYMPHOCYTES RELATIVE PERCENT: 24.5 % (ref 20–40)
MCH RBC QN AUTO: 26.6 PG (ref 27–31)
MCHC RBC AUTO-ENTMCNC: 30.9 G/DL (ref 33–37)
MCV RBC AUTO: 86 FL (ref 81–99)
MONOCYTES ABSOLUTE: 0.6 K/UL (ref 0–0.9)
MONOCYTES RELATIVE PERCENT: 7.4 % (ref 0–10)
NEUTROPHILS ABSOLUTE: 4.7 K/UL (ref 1.5–7.5)
NEUTROPHILS RELATIVE PERCENT: 61.8 % (ref 50–65)
PDW BLD-RTO: 15 % (ref 11.5–14.5)
PLATELET # BLD: 258 K/UL (ref 130–400)
PMV BLD AUTO: 9.6 FL (ref 9.4–12.3)
POTASSIUM SERPL-SCNC: 4.4 MMOL/L (ref 3.5–5)
RBC # BLD: 4.85 M/UL (ref 4.2–5.4)
SODIUM BLD-SCNC: 144 MMOL/L (ref 136–145)
TOTAL PROTEIN: 7.7 G/DL (ref 6.6–8.7)
WBC # BLD: 7.7 K/UL (ref 4.8–10.8)

## 2018-08-28 PROCEDURE — 99213 OFFICE O/P EST LOW 20 MIN: CPT | Performed by: NURSE PRACTITIONER

## 2018-08-28 PROCEDURE — 73630 X-RAY EXAM OF FOOT: CPT

## 2018-08-28 ASSESSMENT — ENCOUNTER SYMPTOMS
COUGH: 0
NAUSEA: 0
WHEEZING: 0
CHEST TIGHTNESS: 0
COLOR CHANGE: 1
SHORTNESS OF BREATH: 0
DIARRHEA: 0
ABDOMINAL PAIN: 0
SORE THROAT: 0

## 2018-08-28 NOTE — PROGRESS NOTES
Ludwig Lacey is a 58 y.o. female who presents today for   Chief Complaint   Patient presents with    Follow-up       HPI:  Here for f/u on R great toe cellulitis, paronychia. I have seen her weekly for the past 3 weeks and she is gradually improving. Initially treated with cephalexin but wound culture grew staph aureus, enterococcus, rare pasteurella canis. Last week I switched her to both amoxicillin and clindamycin which she is tolerating. Overall she is improving. Erythema is much better. She still has scant drainage from beneath the nail but that has improved greatly. Her toenail is becoming very loose. She continues to have some pain beneath the nail and extending partially into the proximal to mid great toe but better. Have held off on imaging at her request to see if meds would help. I have referred her to podiatry but she has not heard anything yet. She states she does feel somewhat poorly. No known fever but feels like she has a low-grade fever at night. She has underlying multinodular goiter which has been monitored by ENT with normal TSH in Feb.  They are monitoring with US as well. BP is elevated which is a chronic issue when here. As previously documented, she states bp is controlled at home and has declined bp medication. Review of Systems   Constitutional: Positive for fever (possible low grade). Negative for chills. HENT: Negative for congestion, ear pain and sore throat. Respiratory: Negative for cough, chest tightness, shortness of breath and wheezing. Cardiovascular: Negative for chest pain. Gastrointestinal: Negative for abdominal pain, diarrhea and nausea. Musculoskeletal: Positive for arthralgias (R great toe). Negative for myalgias. Skin: Positive for color change (dystrophic R great nail, drainage; see HPI). Negative for rash.        Past Medical History:   Diagnosis Date    Concussion     hx of; in high school    Left foot soft tissue tumor     instructions. No Follow-up on file.

## 2018-08-28 NOTE — TELEPHONE ENCOUNTER
The referral says its ready to schedule. Did she have someone in mind that she wanted to see?   [address]

## 2018-08-28 NOTE — TELEPHONE ENCOUNTER
I put in the order comments Emy Villeda or Noe Alarcon. She had no preference. I am not sure if there are any other podiatrists in [place].

## 2018-09-13 ENCOUNTER — OFFICE VISIT (OUTPATIENT)
Dept: FAMILY MEDICINE CLINIC | Age: 63
End: 2018-09-13
Payer: COMMERCIAL

## 2018-09-13 VITALS
HEART RATE: 89 BPM | WEIGHT: 293 LBS | TEMPERATURE: 98.2 F | RESPIRATION RATE: 20 BRPM | BODY MASS INDEX: 48.81 KG/M2 | SYSTOLIC BLOOD PRESSURE: 132 MMHG | OXYGEN SATURATION: 98 % | DIASTOLIC BLOOD PRESSURE: 78 MMHG

## 2018-09-13 DIAGNOSIS — B35.1 ONYCHOMYCOSIS: ICD-10-CM

## 2018-09-13 DIAGNOSIS — L60.0 INGROWN TOENAIL: Primary | ICD-10-CM

## 2018-09-13 PROCEDURE — 99213 OFFICE O/P EST LOW 20 MIN: CPT | Performed by: FAMILY MEDICINE

## 2018-09-13 NOTE — PROGRESS NOTES
[address]  [address] [facility] [address] [facility] [address]  [address] [facility] [address]  Dept: [phone]  Dept Fax: [phone]  Loc: [phone]    Jesica Hart is a 58 y.o. female who presents today for her medical conditions/complaints as noted below. Jesica Hart is here for Follow-up (cellulitis )        HPI:   CC: Here today to discuss the following:  She is here for follow-up for her right great toe. She developed mild cellulitis and was placed on antibiotics. Swelling and redness have improved but she tends to have pain associated with the toenail of the left foot. HPI    Past Medical History:   Diagnosis Date    Concussion     hx of; in high school    Left foot soft tissue tumor     Left leg DVT ([address]     Ovarian cyst     Retinal tear     Right eye    Tumor, thyroid     Dr. Mikal Elmore managing      Past Surgical History:   Procedure Laterality Date    EYE SURGERY      FOOT SURGERY      SHAZIA AND BSO N/A 12/30/2015    HYSTERECTOMY ABDOMINAL TOTAL BILATERAL SALPINGO-OOPHORECTOMY; EXAM UNDER ANESTHESIA; AND CYSTOSCOPY  performed by Dima Thomas MD at [facility] OR       Family History   Problem Relation Age of Onset    Other Mother         pulmonary embolism       Social History   Substance Use Topics    Smoking status: Never Smoker    Smokeless tobacco: Never Used    Alcohol use No      Current Outpatient Prescriptions   Medication Sig Dispense Refill    nystatin (MYCOSTATIN) 591767 UNIT/GM cream Apply topically 2 times daily. 1 Tube 0    ibuprofen (ADVIL;MOTRIN) 800 MG tablet Take 1 tablet by mouth every 6 hours as needed for Pain 120 tablet 3     No current facility-administered medications for this visit.       Allergies   Allergen Reactions    Pineapple Itching and Swelling    Ciprocinonide [Fluocinolone]     Citrus     Sulfur        Health Maintenance   Topic Date Due    Hepatitis C screen  1955    DTaP/Tdap/Td vaccine (1 - Tdap) 12/21/1974

## 2019-02-19 DIAGNOSIS — E04.2 MULTINODULAR GOITER: Primary | ICD-10-CM

## 2019-03-04 DIAGNOSIS — Z13.220 SCREENING FOR LIPID DISORDERS: Primary | ICD-10-CM

## 2019-03-08 ENCOUNTER — OFFICE VISIT (OUTPATIENT)
Dept: FAMILY MEDICINE CLINIC | Age: 64
End: 2019-03-08
Payer: COMMERCIAL

## 2019-03-08 VITALS
DIASTOLIC BLOOD PRESSURE: 88 MMHG | OXYGEN SATURATION: 98 % | BODY MASS INDEX: 48.87 KG/M2 | WEIGHT: 293 LBS | TEMPERATURE: 98.8 F | HEART RATE: 95 BPM | SYSTOLIC BLOOD PRESSURE: 146 MMHG

## 2019-03-08 DIAGNOSIS — Z00.00 ENCOUNTER FOR WELL ADULT EXAM WITHOUT ABNORMAL FINDINGS: Primary | ICD-10-CM

## 2019-03-08 DIAGNOSIS — Z13.220 SCREENING FOR LIPID DISORDERS: ICD-10-CM

## 2019-03-08 DIAGNOSIS — Z00.00 ENCOUNTER FOR WELL ADULT EXAM WITHOUT ABNORMAL FINDINGS: ICD-10-CM

## 2019-03-08 LAB
ALBUMIN SERPL-MCNC: 4.4 G/DL (ref 3.5–5.2)
ALP BLD-CCNC: 80 U/L (ref 35–104)
ALT SERPL-CCNC: 21 U/L (ref 5–33)
ANION GAP SERPL CALCULATED.3IONS-SCNC: 12 MMOL/L (ref 7–19)
AST SERPL-CCNC: 16 U/L (ref 5–32)
BILIRUB SERPL-MCNC: 0.3 MG/DL (ref 0.2–1.2)
BUN BLDV-MCNC: 13 MG/DL (ref 8–23)
CALCIUM SERPL-MCNC: 9.5 MG/DL (ref 8.8–10.2)
CHLORIDE BLD-SCNC: 106 MMOL/L (ref 98–111)
CHOLESTEROL, TOTAL: 182 MG/DL (ref 160–199)
CO2: 26 MMOL/L (ref 22–29)
CREAT SERPL-MCNC: 0.7 MG/DL (ref 0.5–0.9)
GFR NON-AFRICAN AMERICAN: >60
GLUCOSE BLD-MCNC: 110 MG/DL (ref 74–109)
HDLC SERPL-MCNC: 36 MG/DL (ref 65–121)
LDL CHOLESTEROL CALCULATED: 104 MG/DL
POTASSIUM SERPL-SCNC: 4.6 MMOL/L (ref 3.5–5)
SODIUM BLD-SCNC: 144 MMOL/L (ref 136–145)
TOTAL PROTEIN: 7.7 G/DL (ref 6.6–8.7)
TRIGL SERPL-MCNC: 210 MG/DL (ref 0–149)

## 2019-03-08 PROCEDURE — 99396 PREV VISIT EST AGE 40-64: CPT | Performed by: CLINICAL NURSE SPECIALIST

## 2019-03-08 RX ORDER — M-VIT,TX,IRON,MINS/CALC/FOLIC 27MG-0.4MG
1 TABLET ORAL DAILY
COMMUNITY

## 2019-03-08 ASSESSMENT — ENCOUNTER SYMPTOMS
WHEEZING: 0
BACK PAIN: 0
SHORTNESS OF BREATH: 0
COLOR CHANGE: 0
EYE PAIN: 0
CONSTIPATION: 0
FACIAL SWELLING: 0
TROUBLE SWALLOWING: 0
EYE DISCHARGE: 0
CHEST TIGHTNESS: 0
COUGH: 1
SINUS PRESSURE: 0
SORE THROAT: 0
ABDOMINAL PAIN: 0
EYE REDNESS: 0
DIARRHEA: 0

## 2019-03-12 ENCOUNTER — LAB (OUTPATIENT)
Dept: LAB | Facility: HOSPITAL | Age: 64
End: 2019-03-12

## 2019-03-12 DIAGNOSIS — E04.2 MULTINODULAR GOITER: ICD-10-CM

## 2019-03-12 LAB — TSH SERPL DL<=0.05 MIU/L-ACNC: 2.65 MIU/ML (ref 0.47–4.68)

## 2019-03-12 PROCEDURE — 36415 COLL VENOUS BLD VENIPUNCTURE: CPT

## 2019-03-12 PROCEDURE — 84443 ASSAY THYROID STIM HORMONE: CPT | Performed by: PHYSICIAN ASSISTANT

## 2019-03-13 NOTE — PROGRESS NOTES
YOB: 1955  Location: [facility]  Location Address: [address] [place] [address], [facility], [address] [place] [address]  Location Phone: [phone]    Chief Complaint   Patient presents with   • Follow-up     THYROID       History of Present Illness  Nicole Song is a 62 y.o. female who presents for follow up of thyroid problems. She has had no current complaints. She denies throat pain, globus sensation, voice change or dysphagia.   THYROID TESTING: TSH was normal. Thyroid ultrasound showed stable nodules in the past.          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     • ibuprofen (ADVIL,MOTRIN) 800 MG tablet TAKE 1 TABLET BY MOUTH EVERY 6 HOURS AS NEEDED FOR PAIN  3   • therapeutic multivitamin-minerals (THERAGRAN-M) tablet Take 1 tablet by mouth.         Sulfa antibiotics; Pineapple; Citric acid; Fluocinolone; and Other    Family History   Problem Relation Age of Onset   • Cancer Other         Family History of Breast Cancer       Social History     Socioeconomic History   • Marital status: Single     Spouse name: Not on file   • Number of children: Not on file   • Years of education: Not on file   • Highest education level: Not on file   Social Needs   • Financial resource strain: Not on file   • Food insecurity - worry: Not on file   • Food insecurity - inability: Not on file   • Transportation needs - medical: Not on file   • Transportation needs - non-medical: Not on file   Occupational History   • Not on file   Tobacco Use   •  Smoking status: Never Smoker   • Smokeless tobacco: Never Used   Substance and Sexual Activity   • Alcohol use: No   • Drug use: Defer   • Sexual activity: Not on file   Other Topics Concern   • Not on file   Social History Narrative   • Not on file       Review of Systems   Constitutional: Negative for activity change, appetite change, chills, diaphoresis, fatigue, fever and unexpected weight change.   HENT: Negative for congestion, dental problem, drooling, ear discharge, ear pain, facial swelling, hearing loss, mouth sores, nosebleeds, postnasal drip, rhinorrhea, sinus pressure, sneezing, sore throat, tinnitus, trouble swallowing and voice change.         Multinodular goiter   Eyes: Negative.    Respiratory: Negative.    Cardiovascular: Negative.    Gastrointestinal: Negative.    Endocrine: Negative.    Skin: Negative.    Allergic/Immunologic: Negative for environmental allergies, food allergies and immunocompromised state.   Neurological: Negative.    Hematological: Negative.    Psychiatric/Behavioral: Negative.        Vitals:    03/14/19 1120   BP: 138/80   Temp: 97.8 °F (36.6 °C)       Body mass index is 48.81 kg/m².    Objective     Physical Exam  CONSTITUTIONAL: well nourished, alert, oriented, in no acute distress     COMMUNICATION AND VOICE: able to communicate normally, normal voice quality    HEAD: normocephalic, no lesions, atraumatic, no tenderness, no masses     FACE: appearance normal, no lesions, no tenderness, no deformities, facial motion symmetric    SALIVARY GLANDS: parotid glands with no tenderness, no swelling, no masses, submandibular glands with normal size, nontender    EYES: ocular motility normal, eyelids normal, orbits normal, no proptosis, conjunctiva normal , pupils equal, round     EARS:  Hearing: response to conversational voice normal bilaterally   External Ears: auricles without lesions  Otoscopic: tympanic membrane appearance normal, no lesions, no perforation, normal mobility, no  fluid    NOSE:  External Nose: structure normal, no tenderness on palpation, no nasal discharge, no lesions, no evidence of trauma, nostrils patent   Intranasal Exam: nasal mucosa normal, vestibule within normal limits, inferior turbinate normal, nasal septum midline   Nasopharynx:     ORAL:  Lips: upper and lower lips without lesion   Teeth: dentition within normal limits for age   Gums: gingivae healthy   Oral Mucosa: oral mucosa normal, no mucosal lesions   Floor of Mouth: Warthin’s duct patent, mucosa normal  Tongue: lingual mucosa normal without lesions, normal tongue mobility   Palate: soft and hard palates with normal mucosa and structure  Oropharynx: oropharyngeal mucosa normal    NECK: neck appearance normal, no mass,  noted without erythema or tenderness    THYROID:  thyromegaly, no tenderness, right 2.5 cm nodule stable on palpation, position midline     LYMPH NODES: no lymphadenopathy    CHEST/RESPIRATORY: respiratory effort normal, normal breath sounds     CARDIOVASCULAR: rate and rhythm normal, extremities without cyanosis or edema      NEUROLOGIC/PSYCHIATRIC: oriented to time, place and person, mood normal, affect appropriate, CN II-XII intact grossly    Assessment/Plan   Nicole was seen today for follow-up.    Diagnoses and all orders for this visit:    Multinodular goiter  -     TSH; Future  -     US Thyroid; Future      * Surgery not found *  Orders Placed This Encounter   Procedures   • US Thyroid     Standing Status:   Future     Standing Expiration Date:   3/14/2020     Order Specific Question:   Reason for Exam:     Answer:   multinodular goiter   • TSH     Standing Status:   Future     Standing Expiration Date:   3/14/2020     Return in about 1 year (around 3/14/2020) for Recheck thyroid with ultrasound and lab.       Patient Instructions   Will continue to monitor with repeat ultrasound and TSH. If symptoms worsen call for sooner appointment.      MyPlate from [employer]  MyPlate is an outline of a  general healthy diet based on the 2010 Dietary Guidelines for Americans, from the U.S. Department of Agriculture (USDA). It sets guidelines for how much food you should eat from each food group based on your age, sex, and level of physical activity.  What are tips for following MyPlate?  To follow MyPlate recommendations:  · Eat a wide variety of fruits and vegetables, grains, and protein foods.  · Serve smaller portions and eat less food throughout the day.  · Limit portion sizes to avoid overeating.  · Enjoy your food.  · Get at least 150 minutes of exercise every week. This is about 30 minutes each day, 5 or more days per week.    It can be difficult to have every meal look like MyPlate. Think about MyPlate as eating guidelines for an entire day, rather than each individual meal.  Fruits and Vegetables  · Make half of your plate fruits and vegetables.  · Eat many different colors of fruits and vegetables each day.  · For a 2,000 calorie daily food plan, eat:  ? 2½ cups of vegetables every day.  ? 2 cups of fruit every day.  · 1 cup is equal to:  ? 1 cup raw or cooked vegetables.  ? 1 cup raw fruit.  ? 1 medium-sized orange, apple, or banana.  ? 1 cup 100% fruit or vegetable juice.  ? 2 cups raw leafy greens, such as lettuce, spinach, or kale.  ? ½ cup dried fruit.  Grains  · One fourth of your plate should be grains.  · Make at least half of the grains you eat each day whole grains.  · For a 2,000 calorie daily food plan, eat 6 oz of grains every day.  · 1 oz is equal to:  ? 1 slice bread.  ? 1 cup cereal.  ? ½ cup cooked rice, cereal, or pasta.  Protein  · One fourth of your plate should be protein.  · Eat a wide variety of protein foods, including meat, poultry, fish, eggs, beans, nuts, and tofu.  · For a 2,000 calorie daily food plan, eat 5½ oz of protein every day.  · 1 oz is equal to:  ? 1 oz meat, poultry, or fish.  ? ¼ cup cooked beans.  ? 1 egg.  ? ½ oz nuts or seeds.  ? 1 Tbsp peanut  butter.  Dairy  · Drink fat-free or low-fat (1%) milk.  · Eat or drink dairy as a side to meals.  · For a 2,000 calorie daily food plan, eat or drink 3 cups of dairy every day.  · 1 cup is equal to:  ? 1 cup milk, yogurt, cottage cheese, or soy milk (soy beverage).  ? 2 oz processed cheese.  ? 1½ oz natural cheese.  Fats, oils, salt, and sugars  · Only small amounts of oils are recommended.  · Avoid foods that are high in calories and low in nutritional value (empty calories), like foods high in fat or added sugars.  · Choose foods that are low in salt (sodium). Choose foods that have less than 140 milligrams (mg) of sodium per serving.  · Drink water instead of sugary drinks. Drink enough water each day to keep your urine pale yellow.  Where to find support  · Work with your health care provider or a nutrition specialist (dietitian) to develop a customized eating plan that is right for you.  · Download an kacie (mobile application) to help you track your daily food intake.  Where to find more information  · Go to ChooseMyPlate.gov for more information.  · Learn more and log your daily food intake according to MyPlate using USDA's [employer]: www.[employer]er.usda.gov  Summary  · MyPlate is a general guideline for healthy eating from the USDA. It is based on the 2010 Dietary Guidelines for Americans.  · In general, fruits and vegetables should take up ½ of your plate, grains should take up ¼ of your plate, and protein should take up ¼ of your plate.  This information is not intended to replace advice given to you by your health care provider. Make sure you discuss any questions you have with your health care provider.  Document Released: 01/06/2009 Document Revised: 03/19/2018 Document Reviewed: 03/19/2018  Elsevier Interactive Patient Education © 2019 [employer].     Calorie Counting for Weight Loss  Calories are units of energy. Your body needs a certain amount of calories from food to keep you going throughout  the day. When you eat more calories than your body needs, your body stores the extra calories as fat. When you eat fewer calories than your body needs, your body burns fat to get the energy it needs.  Calorie counting means keeping track of how many calories you eat and drink each day. Calorie counting can be helpful if you need to lose weight. If you make sure to eat fewer calories than your body needs, you should lose weight. Ask your health care provider what a healthy weight is for you.  For calorie counting to work, you will need to eat the right number of calories in a day in order to lose a healthy amount of weight per week. A dietitian can help you determine how many calories you need in a day and will give you suggestions on how to reach your calorie goal.  · A healthy amount of weight to lose per week is usually 1-2 lb (0.5-0.9 kg). This usually means that your daily calorie intake should be reduced by 500-750 calories.  · Eating 1,200 - 1,500 calories per day can help most women lose weight.  · Eating 1,500 - 1,800 calories per day can help most men lose weight.    What is my plan?  My goal is to have __________ calories per day.  If I have this many calories per day, I should lose around __________ pounds per week.  What do I need to know about calorie counting?  In order to meet your daily calorie goal, you will need to:  · Find out how many calories are in each food you would like to eat. Try to do this before you eat.  · Decide how much of the food you plan to eat.  · Write down what you ate and how many calories it had. Doing this is called keeping a food log.    To successfully lose weight, it is important to balance calorie counting with a healthy lifestyle that includes regular activity. Aim for 150 minutes of moderate exercise (such as walking) or 75 minutes of vigorous exercise (such as running) each week.  Where do I find calorie information?    The number of calories in a food can be found on  a Nutrition Facts label. If a food does not have a Nutrition Facts label, try to look up the calories online or ask your dietitian for help.  Remember that calories are listed per serving. If you choose to have more than one serving of a food, you will have to multiply the calories per serving by the amount of servings you plan to eat. For example, the label on a package of bread might say that a serving size is 1 slice and that there are 90 calories in a serving. If you eat 1 slice, you will have eaten 90 calories. If you eat 2 slices, you will have eaten 180 calories.  How do I keep a food log?  Immediately after each meal, record the following information in your food log:  · What you ate. Don't forget to include toppings, sauces, and other extras on the food.  · How much you ate. This can be measured in cups, ounces, or number of items.  · How many calories each food and drink had.  · The total number of calories in the meal.    Keep your food log near you, such as in a small notebook in your pocket, or use a mobile kacie or website. Some programs will calculate calories for you and show you how many calories you have left for the day to meet your goal.  What are some calorie counting tips?  · Use your calories on foods and drinks that will fill you up and not leave you hungry:  ? Some examples of foods that fill you up are nuts and nut butters, vegetables, lean proteins, and high-fiber foods like whole grains. High-fiber foods are foods with more than 5 g fiber per serving.  ? Drinks such as sodas, specialty coffee drinks, alcohol, and juices have a lot of calories, yet do not fill you up.  · Eat nutritious foods and avoid empty calories. Empty calories are calories you get from foods or beverages that do not have many vitamins or protein, such as candy, sweets, and soda. It is better to have a nutritious high-calorie food (such as an avocado) than a food with few nutrients (such as a bag of chips).  · Know how  "many calories are in the foods you eat most often. This will help you calculate calorie counts faster.  · Pay attention to calories in drinks. Low-calorie drinks include water and unsweetened drinks.  · Pay attention to nutrition labels for \"low fat\" or \"fat free\" foods. These foods sometimes have the same amount of calories or more calories than the full fat versions. They also often have added sugar, starch, or salt, to make up for flavor that was removed with the fat.  · Find a way of tracking calories that works for you. Get creative. Try different apps or programs if writing down calories does not work for you.  What are some portion control tips?  · Know how many calories are in a serving. This will help you know how many servings of a certain food you can have.  · Use a measuring cup to measure serving sizes. You could also try weighing out portions on a kitchen scale. With time, you will be able to estimate serving sizes for some foods.  · Take some time to put servings of different foods on your favorite plates, bowls, and cups so you know what a serving looks like.  · Try not to eat straight from a bag or box. Doing this can lead to overeating. Put the amount you would like to eat in a cup or on a plate to make sure you are eating the right portion.  · Use smaller plates, glasses, and bowls to prevent overeating.  · Try not to multitask (for example, watch TV or use your computer) while eating. If it is time to eat, sit down at a table and enjoy your food. This will help you to know when you are full. It will also help you to be aware of what you are eating and how much you are eating.  What are tips for following this plan?  Reading food labels  · Check the calorie count compared to the serving size. The serving size may be smaller than what you are used to eating.  · Check the source of the calories. Make sure the food you are eating is high in vitamins and protein and low in saturated and trans " fats.  Shopping  · Read nutrition labels while you shop. This will help you make healthy decisions before you decide to purchase your food.  · Make a grocery list and stick to it.  Cooking  · Try to cook your favorite foods in a healthier way. For example, try baking instead of frying.  · Use low-fat dairy products.  Meal planning  · Use more fruits and vegetables. Half of your plate should be fruits and vegetables.  · Include lean proteins like poultry and fish.  How do I count calories when eating out?  · Ask for smaller portion sizes.  · Consider sharing an entree and sides instead of getting your own entree.  · If you get your own entree, eat only half. Ask for a box at the beginning of your meal and put the rest of your entree in it so you are not tempted to eat it.  · If calories are listed on the menu, choose the lower calorie options.  · Choose dishes that include vegetables, fruits, whole grains, low-fat dairy products, and lean protein.  · Choose items that are boiled, broiled, grilled, or steamed. Stay away from items that are buttered, battered, fried, or served with cream sauce. Items labeled “crispy” are usually fried, unless stated otherwise.  · Choose water, low-fat milk, unsweetened iced tea, or other drinks without added sugar. If you want an alcoholic beverage, choose a lower calorie option such as a glass of wine or light beer.  · Ask for dressings, sauces, and syrups on the side. These are usually high in calories, so you should limit the amount you eat.  · If you want a salad, choose a garden salad and ask for grilled meats. Avoid extra toppings like cisneros, cheese, or fried items. Ask for the dressing on the side, or ask for olive oil and vinegar or lemon to use as dressing.  · Estimate how many servings of a food you are given. For example, a serving of cooked rice is ½ cup or about the size of half a baseball. Knowing serving sizes will help you be aware of how much food you are eating at  restaurants. The list below tells you how big or small some common portion sizes are based on everyday objects:  ? 1 oz--4 stacked dice.  ? 3 oz--1 deck of cards.  ? 1 tsp--1 die.  ? 1 Tbsp--½ a ping-pong ball.  ? 2 Tbsp--1 ping-pong ball.  ? ½ cup--½ baseball.  ? 1 cup--1 baseball.  Summary  · Calorie counting means keeping track of how many calories you eat and drink each day. If you eat fewer calories than your body needs, you should lose weight.  · A healthy amount of weight to lose per week is usually 1-2 lb (0.5-0.9 kg). This usually means reducing your daily calorie intake by 500-750 calories.  · The number of calories in a food can be found on a Nutrition Facts label. If a food does not have a Nutrition Facts label, try to look up the calories online or ask your dietitian for help.  · Use your calories on foods and drinks that will fill you up, and not on foods and drinks that will leave you hungry.  · Use smaller plates, glasses, and bowls to prevent overeating.  This information is not intended to replace advice given to you by your health care provider. Make sure you discuss any questions you have with your health care provider.  Document Released: 12/18/2006 Document Revised: 11/17/2017 Document Reviewed: 11/17/2017  [employer] Interactive Patient Education © 2019 [employer]     Exercising to Lose Weight  Exercising can help you to lose weight. In order to lose weight through exercise, you need to do vigorous-intensity exercise. You can tell that you are exercising with vigorous intensity if you are breathing very hard and fast and cannot hold a conversation while exercising.  Moderate-intensity exercise helps to maintain your current weight. You can tell that you are exercising at a moderate level if you have a higher heart rate and faster breathing, but you are still able to hold a conversation.  How often should I exercise?  Choose an activity that you enjoy and set realistic goals. Your health care  provider can help you to make an activity plan that works for you. Exercise regularly as directed by your health care provider. This may include:  · Doing resistance training twice each week, such as:  ? Push-ups.  ? Sit-ups.  ? Lifting weights.  ? Using resistance bands.  · Doing a given intensity of exercise for a given amount of time. Choose from these options:  ? 150 minutes of moderate-intensity exercise every week.  ? 75 minutes of vigorous-intensity exercise every week.  ? A mix of moderate-intensity and vigorous-intensity exercise every week.    Children, pregnant women, people who are out of shape, people who are overweight, and older adults may need to consult a health care provider for individual recommendations. If you have any sort of medical condition, be sure to consult your health care provider before starting a new exercise program.  What are some activities that can help me to lose weight?  · Walking at a rate of at least 4.5 miles an hour.  · Jogging or running at a rate of 5 miles per hour.  · Biking at a rate of at least 10 miles per hour.  · Lap swimming.  · Roller-skating or in-line skating.  · Cross-country skiing.  · Vigorous competitive sports, such as football, basketball, and soccer.  · Jumping rope.  · Aerobic dancing.  How can I be more active in my day-to-day activities?  · Use the stairs instead of the elevator.  · Take a walk during your lunch break.  · If you drive, park your car farther away from work or school.  · If you take public transportation, get off one stop early and walk the rest of the way.  · Make all of your phone calls while standing up and walking around.  · Get up, stretch, and walk around every 30 minutes throughout the day.  What guidelines should I follow while exercising?  · Do not exercise so much that you hurt yourself, feel dizzy, or get very short of breath.  · Consult your health care provider prior to starting a new exercise program.  · Wear comfortable  clothes and shoes with good support.  · Drink plenty of water while you exercise to prevent dehydration or heat stroke. Body water is lost during exercise and must be replaced.  · Work out until you breathe faster and your heart beats faster.  This information is not intended to replace advice given to you by your health care provider. Make sure you discuss any questions you have with your health care provider.  Document Released: 01/20/2012 Document Revised: 05/25/2017 Document Reviewed: 05/21/2015  [employer] Interactive Patient Education © 2019 [employer]

## 2019-03-14 ENCOUNTER — OFFICE VISIT (OUTPATIENT)
Dept: OTOLARYNGOLOGY | Facility: CLINIC | Age: 64
End: 2019-03-14

## 2019-03-14 VITALS
HEIGHT: 68 IN | BODY MASS INDEX: 44.41 KG/M2 | DIASTOLIC BLOOD PRESSURE: 80 MMHG | WEIGHT: 293 LBS | TEMPERATURE: 97.8 F | SYSTOLIC BLOOD PRESSURE: 138 MMHG

## 2019-03-14 DIAGNOSIS — E04.2 MULTINODULAR GOITER: Primary | ICD-10-CM

## 2019-03-14 PROCEDURE — 99213 OFFICE O/P EST LOW 20 MIN: CPT | Performed by: PHYSICIAN ASSISTANT

## 2019-03-14 RX ORDER — M-VIT,TX,IRON,MINS/CALC/FOLIC 27MG-0.4MG
1 TABLET ORAL
COMMUNITY

## 2019-03-14 NOTE — PATIENT INSTRUCTIONS
Will continue to monitor with repeat ultrasound and TSH. If symptoms worsen call for sooner appointment.      MyPlate from USDA  MyPlate is an outline of a general healthy diet based on the 2010 Dietary Guidelines for Americans, from the U.S. Department of Agriculture (USDA). It sets guidelines for how much food you should eat from each food group based on your age, sex, and level of physical activity.  What are tips for following MyPlate?  To follow MyPlate recommendations:  · Eat a wide variety of fruits and vegetables, grains, and protein foods.  · Serve smaller portions and eat less food throughout the day.  · Limit portion sizes to avoid overeating.  · Enjoy your food.  · Get at least 150 minutes of exercise every week. This is about 30 minutes each day, 5 or more days per week.    It can be difficult to have every meal look like MyPlate. Think about MyPlate as eating guidelines for an entire day, rather than each individual meal.  Fruits and Vegetables  · Make half of your plate fruits and vegetables.  · Eat many different colors of fruits and vegetables each day.  · For a 2,000 calorie daily food plan, eat:  ? 2½ cups of vegetables every day.  ? 2 cups of fruit every day.  · 1 cup is equal to:  ? 1 cup raw or cooked vegetables.  ? 1 cup raw fruit.  ? 1 medium-sized orange, apple, or banana.  ? 1 cup 100% fruit or vegetable juice.  ? 2 cups raw leafy greens, such as lettuce, spinach, or kale.  ? ½ cup dried fruit.  Grains  · One fourth of your plate should be grains.  · Make at least half of the grains you eat each day whole grains.  · For a 2,000 calorie daily food plan, eat 6 oz of grains every day.  · 1 oz is equal to:  ? 1 slice bread.  ? 1 cup cereal.  ? ½ cup cooked rice, cereal, or pasta.  Protein  · One fourth of your plate should be protein.  · Eat a wide variety of protein foods, including meat, poultry, fish, eggs, beans, nuts, and tofu.  · For a 2,000 calorie daily food plan, eat 5½ oz of protein  every day.  · 1 oz is equal to:  ? 1 oz meat, poultry, or fish.  ? ¼ cup cooked beans.  ? 1 egg.  ? ½ oz nuts or seeds.  ? 1 Tbsp peanut butter.  Dairy  · Drink fat-free or low-fat (1%) milk.  · Eat or drink dairy as a side to meals.  · For a 2,000 calorie daily food plan, eat or drink 3 cups of dairy every day.  · 1 cup is equal to:  ? 1 cup milk, yogurt, cottage cheese, or soy milk (soy beverage).  ? 2 oz processed cheese.  ? 1½ oz natural cheese.  Fats, oils, salt, and sugars  · Only small amounts of oils are recommended.  · Avoid foods that are high in calories and low in nutritional value (empty calories), like foods high in fat or added sugars.  · Choose foods that are low in salt (sodium). Choose foods that have less than 140 milligrams (mg) of sodium per serving.  · Drink water instead of sugary drinks. Drink enough water each day to keep your urine pale yellow.  Where to find support  · Work with your health care provider or a nutrition specialist (dietitian) to develop a customized eating plan that is right for you.  · Download an kacie (mobile application) to help you track your daily food intake.  Where to find more information  · Go to ChooseMyPlate.gov for more information.  · Learn more and log your daily food intake according to MyPlate using USDA's SuperTracker: www.supertracker.usda.gov  Summary  · MyPlate is a general guideline for healthy eating from the USDA. It is based on the 2010 Dietary Guidelines for Americans.  · In general, fruits and vegetables should take up ½ of your plate, grains should take up ¼ of your plate, and protein should take up ¼ of your plate.  This information is not intended to replace advice given to you by your health care provider. Make sure you discuss any questions you have with your health care provider.  Document Released: 01/06/2009 Document Revised: 03/19/2018 Document Reviewed: 03/19/2018  [employer] Interactive Patient Education © 2019 [employer]     Calorie  Counting for Weight Loss  Calories are units of energy. Your body needs a certain amount of calories from food to keep you going throughout the day. When you eat more calories than your body needs, your body stores the extra calories as fat. When you eat fewer calories than your body needs, your body burns fat to get the energy it needs.  Calorie counting means keeping track of how many calories you eat and drink each day. Calorie counting can be helpful if you need to lose weight. If you make sure to eat fewer calories than your body needs, you should lose weight. Ask your health care provider what a healthy weight is for you.  For calorie counting to work, you will need to eat the right number of calories in a day in order to lose a healthy amount of weight per week. A dietitian can help you determine how many calories you need in a day and will give you suggestions on how to reach your calorie goal.  · A healthy amount of weight to lose per week is usually 1-2 lb (0.5-0.9 kg). This usually means that your daily calorie intake should be reduced by 500-750 calories.  · Eating 1,200 - 1,500 calories per day can help most women lose weight.  · Eating 1,500 - 1,800 calories per day can help most men lose weight.    What is my plan?  My goal is to have __________ calories per day.  If I have this many calories per day, I should lose around __________ pounds per week.  What do I need to know about calorie counting?  In order to meet your daily calorie goal, you will need to:  · Find out how many calories are in each food you would like to eat. Try to do this before you eat.  · Decide how much of the food you plan to eat.  · Write down what you ate and how many calories it had. Doing this is called keeping a food log.    To successfully lose weight, it is important to balance calorie counting with a healthy lifestyle that includes regular activity. Aim for 150 minutes of moderate exercise (such as walking) or 75 minutes  of vigorous exercise (such as running) each week.  Where do I find calorie information?    The number of calories in a food can be found on a Nutrition Facts label. If a food does not have a Nutrition Facts label, try to look up the calories online or ask your dietitian for help.  Remember that calories are listed per serving. If you choose to have more than one serving of a food, you will have to multiply the calories per serving by the amount of servings you plan to eat. For example, the label on a package of bread might say that a serving size is 1 slice and that there are 90 calories in a serving. If you eat 1 slice, you will have eaten 90 calories. If you eat 2 slices, you will have eaten 180 calories.  How do I keep a food log?  Immediately after each meal, record the following information in your food log:  · What you ate. Don't forget to include toppings, sauces, and other extras on the food.  · How much you ate. This can be measured in cups, ounces, or number of items.  · How many calories each food and drink had.  · The total number of calories in the meal.    Keep your food log near you, such as in a small notebook in your pocket, or use a mobile kacie or website. Some programs will calculate calories for you and show you how many calories you have left for the day to meet your goal.  What are some calorie counting tips?  · Use your calories on foods and drinks that will fill you up and not leave you hungry:  ? Some examples of foods that fill you up are nuts and nut butters, vegetables, lean proteins, and high-fiber foods like whole grains. High-fiber foods are foods with more than 5 g fiber per serving.  ? Drinks such as sodas, specialty coffee drinks, alcohol, and juices have a lot of calories, yet do not fill you up.  · Eat nutritious foods and avoid empty calories. Empty calories are calories you get from foods or beverages that do not have many vitamins or protein, such as candy, sweets, and soda. It  "is better to have a nutritious high-calorie food (such as an avocado) than a food with few nutrients (such as a bag of chips).  · Know how many calories are in the foods you eat most often. This will help you calculate calorie counts faster.  · Pay attention to calories in drinks. Low-calorie drinks include water and unsweetened drinks.  · Pay attention to nutrition labels for \"low fat\" or \"fat free\" foods. These foods sometimes have the same amount of calories or more calories than the full fat versions. They also often have added sugar, starch, or salt, to make up for flavor that was removed with the fat.  · Find a way of tracking calories that works for you. Get creative. Try different apps or programs if writing down calories does not work for you.  What are some portion control tips?  · Know how many calories are in a serving. This will help you know how many servings of a certain food you can have.  · Use a measuring cup to measure serving sizes. You could also try weighing out portions on a kitchen scale. With time, you will be able to estimate serving sizes for some foods.  · Take some time to put servings of different foods on your favorite plates, bowls, and cups so you know what a serving looks like.  · Try not to eat straight from a bag or box. Doing this can lead to overeating. Put the amount you would like to eat in a cup or on a plate to make sure you are eating the right portion.  · Use smaller plates, glasses, and bowls to prevent overeating.  · Try not to multitask (for example, watch TV or use your computer) while eating. If it is time to eat, sit down at a table and enjoy your food. This will help you to know when you are full. It will also help you to be aware of what you are eating and how much you are eating.  What are tips for following this plan?  Reading food labels  · Check the calorie count compared to the serving size. The serving size may be smaller than what you are used to " eating.  · Check the source of the calories. Make sure the food you are eating is high in vitamins and protein and low in saturated and trans fats.  Shopping  · Read nutrition labels while you shop. This will help you make healthy decisions before you decide to purchase your food.  · Make a grocery list and stick to it.  Cooking  · Try to cook your favorite foods in a healthier way. For example, try baking instead of frying.  · Use low-fat dairy products.  Meal planning  · Use more fruits and vegetables. Half of your plate should be fruits and vegetables.  · Include lean proteins like poultry and fish.  How do I count calories when eating out?  · Ask for smaller portion sizes.  · Consider sharing an entree and sides instead of getting your own entree.  · If you get your own entree, eat only half. Ask for a box at the beginning of your meal and put the rest of your entree in it so you are not tempted to eat it.  · If calories are listed on the menu, choose the lower calorie options.  · Choose dishes that include vegetables, fruits, whole grains, low-fat dairy products, and lean protein.  · Choose items that are boiled, broiled, grilled, or steamed. Stay away from items that are buttered, battered, fried, or served with cream sauce. Items labeled “crispy” are usually fried, unless stated otherwise.  · Choose water, low-fat milk, unsweetened iced tea, or other drinks without added sugar. If you want an alcoholic beverage, choose a lower calorie option such as a glass of wine or light beer.  · Ask for dressings, sauces, and syrups on the side. These are usually high in calories, so you should limit the amount you eat.  · If you want a salad, choose a garden salad and ask for grilled meats. Avoid extra toppings like cisneros, cheese, or fried items. Ask for the dressing on the side, or ask for olive oil and vinegar or lemon to use as dressing.  · Estimate how many servings of a food you are given. For example, a serving of  cooked rice is ½ cup or about the size of half a baseball. Knowing serving sizes will help you be aware of how much food you are eating at restaurants. The list below tells you how big or small some common portion sizes are based on everyday objects:  ? 1 oz--4 stacked dice.  ? 3 oz--1 deck of cards.  ? 1 tsp--1 die.  ? 1 Tbsp--½ a ping-pong ball.  ? 2 Tbsp--1 ping-pong ball.  ? ½ cup--½ baseball.  ? 1 cup--1 baseball.  Summary  · Calorie counting means keeping track of how many calories you eat and drink each day. If you eat fewer calories than your body needs, you should lose weight.  · A healthy amount of weight to lose per week is usually 1-2 lb (0.5-0.9 kg). This usually means reducing your daily calorie intake by 500-750 calories.  · The number of calories in a food can be found on a Nutrition Facts label. If a food does not have a Nutrition Facts label, try to look up the calories online or ask your dietitian for help.  · Use your calories on foods and drinks that will fill you up, and not on foods and drinks that will leave you hungry.  · Use smaller plates, glasses, and bowls to prevent overeating.  This information is not intended to replace advice given to you by your health care provider. Make sure you discuss any questions you have with your health care provider.  Document Released: 12/18/2006 Document Revised: 11/17/2017 Document Reviewed: 11/17/2017  [employer] Interactive Patient Education © 2019 [employer]     Exercising to Lose Weight  Exercising can help you to lose weight. In order to lose weight through exercise, you need to do vigorous-intensity exercise. You can tell that you are exercising with vigorous intensity if you are breathing very hard and fast and cannot hold a conversation while exercising.  Moderate-intensity exercise helps to maintain your current weight. You can tell that you are exercising at a moderate level if you have a higher heart rate and faster breathing, but you are  still able to hold a conversation.  How often should I exercise?  Choose an activity that you enjoy and set realistic goals. Your health care provider can help you to make an activity plan that works for you. Exercise regularly as directed by your health care provider. This may include:  · Doing resistance training twice each week, such as:  ? Push-ups.  ? Sit-ups.  ? Lifting weights.  ? Using resistance bands.  · Doing a given intensity of exercise for a given amount of time. Choose from these options:  ? 150 minutes of moderate-intensity exercise every week.  ? 75 minutes of vigorous-intensity exercise every week.  ? A mix of moderate-intensity and vigorous-intensity exercise every week.    Children, pregnant women, people who are out of shape, people who are overweight, and older adults may need to consult a health care provider for individual recommendations. If you have any sort of medical condition, be sure to consult your health care provider before starting a new exercise program.  What are some activities that can help me to lose weight?  · Walking at a rate of at least 4.5 miles an hour.  · Jogging or running at a rate of 5 miles per hour.  · Biking at a rate of at least 10 miles per hour.  · Lap swimming.  · Roller-skating or in-line skating.  · Cross-country skiing.  · Vigorous competitive sports, such as football, basketball, and soccer.  · Jumping rope.  · Aerobic dancing.  How can I be more active in my day-to-day activities?  · Use the stairs instead of the elevator.  · Take a walk during your lunch break.  · If you drive, park your car farther away from work or school.  · If you take public transportation, get off one stop early and walk the rest of the way.  · Make all of your phone calls while standing up and walking around.  · Get up, stretch, and walk around every 30 minutes throughout the day.  What guidelines should I follow while exercising?  · Do not exercise so much that you hurt yourself,  feel dizzy, or get very short of breath.  · Consult your health care provider prior to starting a new exercise program.  · Wear comfortable clothes and shoes with good support.  · Drink plenty of water while you exercise to prevent dehydration or heat stroke. Body water is lost during exercise and must be replaced.  · Work out until you breathe faster and your heart beats faster.  This information is not intended to replace advice given to you by your health care provider. Make sure you discuss any questions you have with your health care provider.  Document Released: 01/20/2012 Document Revised: 05/25/2017 Document Reviewed: 05/21/2015  [employer] Interactive Patient Education © 2019 [employer]

## 2021-11-05 ENCOUNTER — OFFICE VISIT (OUTPATIENT)
Dept: ENT CLINIC | Age: 66
End: 2021-11-05
Payer: MEDICARE

## 2021-11-05 VITALS
HEART RATE: 94 BPM | DIASTOLIC BLOOD PRESSURE: 112 MMHG | BODY MASS INDEX: 47.9 KG/M2 | TEMPERATURE: 98.2 F | WEIGHT: 293 LBS | OXYGEN SATURATION: 98 % | SYSTOLIC BLOOD PRESSURE: 208 MMHG

## 2021-11-05 DIAGNOSIS — K21.9 LARYNGOPHARYNGEAL REFLUX (LPR): ICD-10-CM

## 2021-11-05 DIAGNOSIS — E04.1 THYROID NODULE: ICD-10-CM

## 2021-11-05 DIAGNOSIS — R13.14 PHARYNGOESOPHAGEAL DYSPHAGIA: Primary | ICD-10-CM

## 2021-11-05 DIAGNOSIS — H61.23 BILATERAL IMPACTED CERUMEN: ICD-10-CM

## 2021-11-05 PROCEDURE — 31575 DIAGNOSTIC LARYNGOSCOPY: CPT | Performed by: PHYSICIAN ASSISTANT

## 2021-11-05 PROCEDURE — 99204 OFFICE O/P NEW MOD 45 MIN: CPT | Performed by: PHYSICIAN ASSISTANT

## 2021-11-05 PROCEDURE — 69210 REMOVE IMPACTED EAR WAX UNI: CPT | Performed by: PHYSICIAN ASSISTANT

## 2021-11-05 RX ORDER — OMEPRAZOLE 20 MG/1
20 CAPSULE, DELAYED RELEASE ORAL
Qty: 60 CAPSULE | Refills: 2 | Status: SHIPPED | OUTPATIENT
Start: 2021-11-05

## 2021-11-05 RX ORDER — ASPIRIN 81 MG/1
81 TABLET ORAL DAILY
COMMUNITY

## 2021-11-05 RX ORDER — ZINC SULFATE 50(220)MG
50 CAPSULE ORAL DAILY
COMMUNITY

## 2021-11-05 ASSESSMENT — ENCOUNTER SYMPTOMS
SINUS PAIN: 0
FACIAL SWELLING: 0
EYE PAIN: 0
SINUS PRESSURE: 0
VOICE CHANGE: 0
RHINORRHEA: 0
EYE DISCHARGE: 0
TROUBLE SWALLOWING: 1
SORE THROAT: 0

## 2021-11-05 NOTE — ASSESSMENT & PLAN NOTE
History of thyroid nodule with last ultrasound being 2 years ago at [address] [facility]: I recommended repeat ultrasound of thyroid for reevaluation. I will call her the results with further recommendations to follow.

## 2021-11-05 NOTE — ASSESSMENT & PLAN NOTE
Dysphagia-question was secondary to an esophageal stricture due to negative laryngoscopy  Plan: I have recommended a barium swallow for further evaluation. I will call her the results with further recommendations to follow.

## 2021-11-05 NOTE — PROGRESS NOTES
Leo Poon is a pleasant 75-year-old  female that was referred by Dr. Emily Ayala due to problems with difficulty swallowing. She reports that she is experiencing dysphagia with solid foods that occurs about 2-3 times per week. She reports that she was previously under the care of [facility] and was noted to have a thyroid nodule that has been closely watched. She also reports of a positive family history of laryngeal cancer, making her concerned of her current symptoms. She denies any hemoptysis or any changes in her appetite. Allergies: Pineapple, Ciprocinonide [fluocinolone], Citrus, and Sulfur      Current Outpatient Medications   Medication Sig Dispense Refill    zinc sulfate (ZINCATE) 220 (50 Zn) MG capsule Take 50 mg by mouth daily      aspirin 81 MG EC tablet Take 81 mg by mouth daily      omeprazole (PRILOSEC) 20 MG delayed release capsule Take 1 capsule by mouth 2 times daily (before meals) 60 capsule 2    Multiple Vitamins-Minerals (THERAPEUTIC MULTIVITAMIN-MINERALS) tablet Take 1 tablet by mouth daily      ibuprofen (ADVIL;MOTRIN) 800 MG tablet Take 1 tablet by mouth every 6 hours as needed for Pain 120 tablet 3     No current facility-administered medications for this visit.        Past Surgical History:   Procedure Laterality Date    EYE SURGERY      FOOT SURGERY      SHAZIA AND BSO N/A 12/30/2015    HYSTERECTOMY ABDOMINAL TOTAL BILATERAL SALPINGO-OOPHORECTOMY; EXAM UNDER ANESTHESIA; AND CYSTOSCOPY  performed by Madhav Sigala MD at [facility] OR       Past Medical History:   Diagnosis Date    Concussion     hx of; in high school    Left foot soft tissue tumor     Left leg DVT ([address]     Ovarian cyst     Retinal tear     Right eye    Tumor, thyroid     Dr. Misti Villeda managing       Family History   Problem Relation Age of Onset    Other Mother         pulmonary embolism       Social History     Tobacco Use    Smoking status: Never Smoker    Smokeless tobacco: Never Used   Substance Use Topics    Alcohol use: No     Alcohol/week: 0.0 standard drinks           REVIEW OF SYSTEMS:  all other systems reviewed and are negative  Review of Systems   Constitutional: Negative for chills and fever. HENT: Positive for congestion, ear pain and trouble swallowing. Negative for dental problem, ear discharge, facial swelling, hearing loss, nosebleeds, postnasal drip, rhinorrhea, sinus pressure, sinus pain, sneezing, sore throat, tinnitus and voice change. Eyes: Negative for pain and discharge. Neurological: Negative for dizziness and headaches. Comments:     PHYSICAL EXAM:    BP (!) 208/112   Pulse 94   Temp 98.2 °F (36.8 °C)   Wt (!) 315 lb (142.9 kg)   SpO2 98%   BMI 47.90 kg/m²   Body mass index is 47.9 kg/m². General Appearance: well developed  and well nourished  Head/ Face: normocephalic and atraumatic  Vocal Quality: good/ normal  Ears: Right Ear: External: external ears normal Otoscopy Ear Canal: cerumen impaction Otoscopy TM: TM's normal and TM's mobile Left Ear: External: external ears normal Otoscopy Ear Canal: cerumen impaction Otoscopy TM: TM's normal and TM's mobile  Hearing: grossly intact  Nose: septum midline and turbinates: hypertrophic  Neck: supple and adenopathy none palpable  Thyroid: normal and nodules No    Assessment & Plan:    Problem List Items Addressed This Visit     Bilateral impacted cerumen     Bilateral cerumen impaction-will remove today with microscopic guidance         Relevant Orders    42877 - [place] REMOVE IMPACTED EAR WAX (Completed)    Thyroid nodule      History of thyroid nodule with last ultrasound being 2 years ago at [address] [facility]: I recommended repeat ultrasound of thyroid for reevaluation. I will call her the results with further recommendations to follow.          Relevant Orders    US THYROID    Pharyngoesophageal dysphagia - Primary     Dysphagia-question was secondary to an esophageal stricture due to negative laryngoscopy  Plan: I have recommended a barium swallow for further evaluation. I will call her the results with further recommendations to follow. Relevant Orders    [place] ESOPHAGRAM    [place] LARYNGOSCOPY FLEXIBLE DIAGNOSTIC (Completed)    Laryngopharyngeal reflux (LPR)     LPR based on laryngoscopy -likely the etiology of her nocturnal cough. Plan: I will place the patient on omeprazole 20 mg twice a day. She is to follow-up in 6 weeks for reevaluation. Orders Placed This Encounter   Procedures    FL ESOPHAGRAM     Standing Status:   Future     Standing Expiration Date:   11/5/2022     Scheduling Instructions:      LMP     Order Specific Question:   Reason for exam:     Answer:   Sporadic dysphagia with negative laryngoscopy    US THYROID     Standing Status:   Future     Standing Expiration Date:   11/5/2022     Scheduling Instructions:      Comparison study from [facility] [place] if possible     Order Specific Question:   Reason for exam:     Answer:   Thyroid nodule-last ultrasound done 2 years ago at [address] [place] [address]     The nares were anesthetized with 4% lidocaine aerosol bilaterally. Each nare was individually evaluated where the patient was found to have engorged turbinates with partial obstruction to the right side. The left side was utilized for the full procedure. The posterior wall of the nasopharyngeal region was unremarkable to exam with no masses or ulcerations. The scope was advanced to the oropharyngeal region where the patient was found to have normal swallowing with mild erythema to the posterior pharynx. No ulcerations or masses were noted. Compression of the thyroid demonstrated no extrinsic compression. The scope was advanced to the epiglottis with the vocal cords were well visualized. The vocal cords demonstrated no nodularities with no masses appreciated.   The vocal cords were symmetrical and fully functional.  The patient tolerated the procedure nicely with no complications.  87160 - [place] REMOVE IMPACTED EAR WAX     With microscopic guidance, the cerumen impaction was removed from both ears with assistance of suction and alligator graspers. The TMs appear to be normal with no evidence of perforation or infection. No evidence of a middle ear effusion was noted. Orders Placed This Encounter   Medications    omeprazole (PRILOSEC) 20 MG delayed release capsule     Sig: Take 1 capsule by mouth 2 times daily (before meals)     Dispense:  60 capsule     Refill:  2         Electronically signed by Patrick Beltrán PA-C on 11/5/21 at 1:11 PM CDT        Please note that this chart was generated using dragon dictation software. Although every effort was made to ensure the accuracy of this automated transcription, some errors in transcription may have occurred.

## 2021-11-05 NOTE — ASSESSMENT & PLAN NOTE
LPR based on laryngoscopy -likely the etiology of her nocturnal cough. Plan: I will place the patient on omeprazole 20 mg twice a day. She is to follow-up in 6 weeks for reevaluation.

## 2021-11-12 ENCOUNTER — HOSPITAL ENCOUNTER (OUTPATIENT)
Dept: GENERAL RADIOLOGY | Age: 66
Discharge: HOME | End: 2021-11-12
Payer: MEDICARE

## 2021-11-12 ENCOUNTER — HOSPITAL ENCOUNTER (OUTPATIENT)
Dept: ULTRASOUND IMAGING | Age: 66
Discharge: HOME | End: 2021-11-12
Payer: MEDICARE

## 2021-11-12 DIAGNOSIS — E04.1 THYROID NODULE: ICD-10-CM

## 2021-11-12 DIAGNOSIS — R13.14 PHARYNGOESOPHAGEAL DYSPHAGIA: ICD-10-CM

## 2021-11-12 PROCEDURE — 74220 X-RAY XM ESOPHAGUS 1CNTRST: CPT

## 2021-11-12 PROCEDURE — 76536 US EXAM OF HEAD AND NECK: CPT

## 2021-11-16 ENCOUNTER — TELEPHONE (OUTPATIENT)
Dept: ENT CLINIC | Age: 66
End: 2021-11-16

## 2021-11-16 DIAGNOSIS — E04.1 THYROID NODULE: Primary | ICD-10-CM

## 2021-11-16 NOTE — TELEPHONE ENCOUNTER
I called the results of the esophagram and the ultrasound the thyroid. The esophagram was normal with no dysphagia or any masses. The thyroid ultrasound demonstrated an increase growth to the right thyroid nodule and a biopsy has been recommended. The patient is agreeable to proceed with this biopsy which we ordered at [facility].  I advised the patient that I will call her the results of the biopsy. She already has a follow-up pulmonary see me in 6 weeks for the LPR. Currently the PPI has decreased her coughing as well as her choking episodes. The patient was reminded to call if she had any further questions or problems.     Electronically signed by Manuel Moreno PA-C on 11/16/21 at 4:50 PM CST

## 2021-12-17 ENCOUNTER — OFFICE VISIT (OUTPATIENT)
Dept: ENT CLINIC | Age: 66
End: 2021-12-17
Payer: MEDICARE

## 2021-12-17 VITALS
BODY MASS INDEX: 44.41 KG/M2 | WEIGHT: 293 LBS | HEIGHT: 68 IN | DIASTOLIC BLOOD PRESSURE: 76 MMHG | SYSTOLIC BLOOD PRESSURE: 132 MMHG

## 2021-12-17 DIAGNOSIS — K21.9 LARYNGOPHARYNGEAL REFLUX (LPR): Primary | ICD-10-CM

## 2021-12-17 DIAGNOSIS — E04.1 THYROID NODULE: ICD-10-CM

## 2021-12-17 PROCEDURE — G8427 DOCREV CUR MEDS BY ELIG CLIN: HCPCS | Performed by: PHYSICIAN ASSISTANT

## 2021-12-17 PROCEDURE — G8400 PT W/DXA NO RESULTS DOC: HCPCS | Performed by: PHYSICIAN ASSISTANT

## 2021-12-17 PROCEDURE — 1036F TOBACCO NON-USER: CPT | Performed by: PHYSICIAN ASSISTANT

## 2021-12-17 PROCEDURE — 99213 OFFICE O/P EST LOW 20 MIN: CPT | Performed by: PHYSICIAN ASSISTANT

## 2021-12-17 PROCEDURE — 1123F ACP DISCUSS/DSCN MKR DOCD: CPT | Performed by: PHYSICIAN ASSISTANT

## 2021-12-17 PROCEDURE — 1090F PRES/ABSN URINE INCON ASSESS: CPT | Performed by: PHYSICIAN ASSISTANT

## 2021-12-17 PROCEDURE — G8417 CALC BMI ABV UP PARAM F/U: HCPCS | Performed by: PHYSICIAN ASSISTANT

## 2021-12-17 PROCEDURE — 3017F COLORECTAL CA SCREEN DOC REV: CPT | Performed by: PHYSICIAN ASSISTANT

## 2021-12-17 PROCEDURE — G8484 FLU IMMUNIZE NO ADMIN: HCPCS | Performed by: PHYSICIAN ASSISTANT

## 2021-12-17 PROCEDURE — 4040F PNEUMOC VAC/ADMIN/RCVD: CPT | Performed by: PHYSICIAN ASSISTANT

## 2021-12-23 ENCOUNTER — HOSPITAL ENCOUNTER (OUTPATIENT)
Dept: ULTRASOUND IMAGING | Age: 66
Discharge: HOME | End: 2021-12-23
Payer: MEDICARE

## 2021-12-23 DIAGNOSIS — E04.1 THYROID NODULE: ICD-10-CM

## 2021-12-23 PROCEDURE — 88172 CYTP DX EVAL FNA 1ST EA SITE: CPT

## 2021-12-23 PROCEDURE — 88173 CYTOPATH EVAL FNA REPORT: CPT

## 2021-12-23 PROCEDURE — 60100 BIOPSY OF THYROID: CPT

## 2021-12-23 PROCEDURE — 88177 CYTP FNA EVAL EA ADDL: CPT

## 2022-01-03 ENCOUNTER — TELEPHONE (OUTPATIENT)
Dept: ENT CLINIC | Age: 67
End: 2022-01-03

## 2022-01-03 DIAGNOSIS — E04.1 THYROID NODULE: Primary | ICD-10-CM

## 2022-01-03 NOTE — TELEPHONE ENCOUNTER
I called the biopsy results of the thyroid biopsy. This demonstrated benign follicular cells with mild atypia. I recommended a follow-up ultrasound of thyroid in 1 year. Patient reports that she is doing well from the biopsy with no complications. Orders placed for the ultrasound in 1 year.     Electronically signed by Darin Magana PA-C on 1/3/22 at 5:10 PM CST

## 2022-01-28 ENCOUNTER — OFFICE VISIT (OUTPATIENT)
Dept: ENT CLINIC | Age: 67
End: 2022-01-28
Payer: MEDICARE

## 2022-01-28 VITALS
SYSTOLIC BLOOD PRESSURE: 138 MMHG | HEIGHT: 68 IN | BODY MASS INDEX: 44.41 KG/M2 | DIASTOLIC BLOOD PRESSURE: 84 MMHG | WEIGHT: 293 LBS

## 2022-01-28 DIAGNOSIS — K21.9 LARYNGOPHARYNGEAL REFLUX (LPR): ICD-10-CM

## 2022-01-28 DIAGNOSIS — E04.1 THYROID NODULE: Primary | ICD-10-CM

## 2022-01-28 DIAGNOSIS — L65.9 ALOPECIA OF SCALP: ICD-10-CM

## 2022-01-28 DIAGNOSIS — E04.1 THYROID NODULE: ICD-10-CM

## 2022-01-28 PROBLEM — H61.23 BILATERAL IMPACTED CERUMEN: Status: RESOLVED | Noted: 2021-11-05 | Resolved: 2022-01-28

## 2022-01-28 LAB
T4 FREE: 1.14 NG/DL (ref 0.93–1.7)
TSH SERPL DL<=0.05 MIU/L-ACNC: 2.39 UIU/ML (ref 0.27–4.2)

## 2022-01-28 PROCEDURE — 1036F TOBACCO NON-USER: CPT | Performed by: PHYSICIAN ASSISTANT

## 2022-01-28 PROCEDURE — G8417 CALC BMI ABV UP PARAM F/U: HCPCS | Performed by: PHYSICIAN ASSISTANT

## 2022-01-28 PROCEDURE — G8400 PT W/DXA NO RESULTS DOC: HCPCS | Performed by: PHYSICIAN ASSISTANT

## 2022-01-28 PROCEDURE — G8427 DOCREV CUR MEDS BY ELIG CLIN: HCPCS | Performed by: PHYSICIAN ASSISTANT

## 2022-01-28 PROCEDURE — 3017F COLORECTAL CA SCREEN DOC REV: CPT | Performed by: PHYSICIAN ASSISTANT

## 2022-01-28 PROCEDURE — 1090F PRES/ABSN URINE INCON ASSESS: CPT | Performed by: PHYSICIAN ASSISTANT

## 2022-01-28 PROCEDURE — 99214 OFFICE O/P EST MOD 30 MIN: CPT | Performed by: PHYSICIAN ASSISTANT

## 2022-01-28 PROCEDURE — G8484 FLU IMMUNIZE NO ADMIN: HCPCS | Performed by: PHYSICIAN ASSISTANT

## 2022-01-28 PROCEDURE — 4040F PNEUMOC VAC/ADMIN/RCVD: CPT | Performed by: PHYSICIAN ASSISTANT

## 2022-01-28 PROCEDURE — 1123F ACP DISCUSS/DSCN MKR DOCD: CPT | Performed by: PHYSICIAN ASSISTANT

## 2022-01-28 ASSESSMENT — ENCOUNTER SYMPTOMS
EYE DISCHARGE: 0
FACIAL SWELLING: 0
SORE THROAT: 0
SINUS PRESSURE: 0
RHINORRHEA: 0
SINUS PAIN: 0
VOICE CHANGE: 0
TROUBLE SWALLOWING: 0
EYE PAIN: 0

## 2022-01-28 NOTE — PROGRESS NOTES
Kennieth Crigler is a pleasant 68-year-old  female that presents for a 6-week follow-up for treatment for LPR. She has been on the PPI therapy twice a day for a total of 3 months and reports that she feels much improved. She no longer is experiencing a choking sensation and reports that her coughing seems to be improved. She has personally decreased her dose down to once a day and has noticed no worsening of symptoms. Patient is also noted with complaints of worsening alopecia to include the scalp and eyebrows and she is concerned she may have worsening thyroid disease. She recently underwent an FNA of a thyroid nodule that pathologically was benign. Overall she admits to generalized fatigue as well as dry hair. She admits to a past history of a borderline TSH level. Allergies: Pineapple, Ciprocinonide [fluocinolone], Citrus, and Elemental sulfur      Current Outpatient Medications   Medication Sig Dispense Refill    zinc sulfate (ZINCATE) 220 (50 Zn) MG capsule Take 50 mg by mouth daily      aspirin 81 MG EC tablet Take 81 mg by mouth daily      omeprazole (PRILOSEC) 20 MG delayed release capsule Take 1 capsule by mouth 2 times daily (before meals) 60 capsule 2    Multiple Vitamins-Minerals (THERAPEUTIC MULTIVITAMIN-MINERALS) tablet Take 1 tablet by mouth daily      ibuprofen (ADVIL;MOTRIN) 800 MG tablet Take 1 tablet by mouth every 6 hours as needed for Pain 120 tablet 3     No current facility-administered medications for this visit.        Past Surgical History:   Procedure Laterality Date    EYE SURGERY      FOOT SURGERY      SHAZIA AND BSO N/A 12/30/2015    HYSTERECTOMY ABDOMINAL TOTAL BILATERAL SALPINGO-OOPHORECTOMY; EXAM UNDER ANESTHESIA; AND CYSTOSCOPY  performed by Angie Perez MD at [place] [address] THYROID BIOPSY  12/23/2021     THYROID BIOPSY 12/23/2021 [facility] ULTRASOUND       Past Medical History:   Diagnosis Date    Concussion     hx of; in high school    Left foot soft tissue tumor     Left leg DVT ([address]     Ovarian cyst     Retinal tear     Right eye    Tumor, thyroid     Dr. Kaelyn Spears managing       Family History   Problem Relation Age of Onset    Other Mother         pulmonary embolism       Social History     Tobacco Use    Smoking status: Never Smoker    Smokeless tobacco: Never Used   Substance Use Topics    Alcohol use: No     Alcohol/week: 0.0 standard drinks           REVIEW OF SYSTEMS:  all other systems reviewed and are negative  Review of Systems   Constitutional: Positive for fatigue. Negative for chills, fever and unexpected weight change. HENT: Negative for congestion, dental problem, ear discharge, ear pain, facial swelling, hearing loss, nosebleeds, postnasal drip, rhinorrhea, sinus pressure, sinus pain, sneezing, sore throat, tinnitus, trouble swallowing and voice change. Eyes: Negative for pain and discharge. Neurological: Negative for dizziness and headaches. Comments:     PHYSICAL EXAM:    /84   Ht 5' 8\" (1.727 m)   Wt (!) 315 lb (142.9 kg)   BMI 47.90 kg/m²   Body mass index is 47.9 kg/m². General Appearance: well developed  and well nourished  Head/ Face: normocephalic and atraumatic  Vocal Quality: good/ normal  Ears: Right Ear: External: external ears normal Otoscopy Ear Canal: canal clear Otoscopy TM: TM's normal and TM's mobile Left Ear: External: external ears normal Otoscopy Ear Canal: canal clear Otoscopy TM: TM's normal and TM's mobile  Hearing: grossly intact  Nose: nares normal and septum midline  Neck: supple and adenopathy none palpable  Thyroid: tender No and nodules Yes and  Right   Patient was noted with balding pattern to the central portion of the scalp with evidence of alopecia to the eyebrows.     Assessment & Plan:    Problem List Items Addressed This Visit     Thyroid nodule - Primary     Benign thyroid nodule from recent biopsy  Plan: I recommended a 1 year follow-up ultrasound the thyroid for evaluation. Patient was reminded to call if she noticed any tenderness from the thyroid region or worsening dysphagia. Relevant Orders    TSH without Reflex (Completed)    T4, Free (Completed)    Thyroid Peroxidase Antibody    Laryngopharyngeal reflux (LPR)      Clinically resolving LPR  Plan: I will have the patient to continue her omeprazole once a day and defer further management back to her PCP. Alopecia of scalp     Alopecia of the scalp and eyebrows-questionable etiology  Plan: At the time of this dictation her serum TSH and free T4 levels were noted to be within normal range. I will defer her back to Dr. Diane Smith for further work-up for her alopecia. I have recommended nioxin shampoo. Orders Placed This Encounter   Procedures    TSH without Reflex     Standing Status:   Future     Number of Occurrences:   1     Standing Expiration Date:   1/28/2023    T4, Free     Standing Status:   Future     Number of Occurrences:   1     Standing Expiration Date:   1/28/2023    Thyroid Peroxidase Antibody     Standing Status:   Future     Number of Occurrences:   1     Standing Expiration Date:   1/28/2023       No orders of the defined types were placed in this encounter. Electronically signed by Artemio Jenkins PA-C on 1/28/22 at 8:31 PM CST            Please note that this chart was generated using dragon dictation software. Although every effort was made to ensure the accuracy of this automated transcription, some errors in transcription may have occurred.

## 2022-01-29 NOTE — ASSESSMENT & PLAN NOTE
Benign thyroid nodule from recent biopsy  Plan: I recommended a 1 year follow-up ultrasound the thyroid for evaluation. Patient was reminded to call if she noticed any tenderness from the thyroid region or worsening dysphagia.

## 2022-01-29 NOTE — ASSESSMENT & PLAN NOTE
Clinically resolving LPR  Plan: I will have the patient to continue her omeprazole once a day and defer further management back to her PCP.

## 2022-01-30 LAB — THYROID PEROXIDASE (TPO) ABS: 47.1 IU/ML (ref 0–9)

## 2022-04-28 ENCOUNTER — HOSPITAL ENCOUNTER (OUTPATIENT)
Dept: GENERAL RADIOLOGY | Age: 67
Discharge: HOME | End: 2022-04-28
Payer: MEDICARE

## 2022-04-28 ENCOUNTER — OFFICE VISIT (OUTPATIENT)
Dept: ENT CLINIC | Age: 67
End: 2022-04-28
Payer: MEDICARE

## 2022-04-28 VITALS
WEIGHT: 293 LBS | SYSTOLIC BLOOD PRESSURE: 134 MMHG | BODY MASS INDEX: 44.41 KG/M2 | DIASTOLIC BLOOD PRESSURE: 78 MMHG | HEIGHT: 68 IN

## 2022-04-28 DIAGNOSIS — H65.92 LEFT NON-SUPPURATIVE OTITIS MEDIA: Primary | ICD-10-CM

## 2022-04-28 DIAGNOSIS — J01.01 ACUTE RECURRENT MAXILLARY SINUSITIS: ICD-10-CM

## 2022-04-28 PROCEDURE — 3017F COLORECTAL CA SCREEN DOC REV: CPT | Performed by: PHYSICIAN ASSISTANT

## 2022-04-28 PROCEDURE — 99213 OFFICE O/P EST LOW 20 MIN: CPT | Performed by: PHYSICIAN ASSISTANT

## 2022-04-28 PROCEDURE — 70220 X-RAY EXAM OF SINUSES: CPT

## 2022-04-28 PROCEDURE — 1123F ACP DISCUSS/DSCN MKR DOCD: CPT | Performed by: PHYSICIAN ASSISTANT

## 2022-04-28 PROCEDURE — G8427 DOCREV CUR MEDS BY ELIG CLIN: HCPCS | Performed by: PHYSICIAN ASSISTANT

## 2022-04-28 PROCEDURE — G8417 CALC BMI ABV UP PARAM F/U: HCPCS | Performed by: PHYSICIAN ASSISTANT

## 2022-04-28 PROCEDURE — 1036F TOBACCO NON-USER: CPT | Performed by: PHYSICIAN ASSISTANT

## 2022-04-28 PROCEDURE — 4040F PNEUMOC VAC/ADMIN/RCVD: CPT | Performed by: PHYSICIAN ASSISTANT

## 2022-04-28 PROCEDURE — 1090F PRES/ABSN URINE INCON ASSESS: CPT | Performed by: PHYSICIAN ASSISTANT

## 2022-04-28 PROCEDURE — G8400 PT W/DXA NO RESULTS DOC: HCPCS | Performed by: PHYSICIAN ASSISTANT

## 2022-04-28 RX ORDER — ECHINACEA PURPUREA EXTRACT 125 MG
2 TABLET ORAL 3 TIMES DAILY
Qty: 1 EACH | Refills: 3 | Status: SHIPPED | OUTPATIENT
Start: 2022-04-28 | End: 2022-04-29

## 2022-04-28 RX ORDER — CEFUROXIME AXETIL 500 MG/1
500 TABLET ORAL 2 TIMES DAILY
Qty: 20 TABLET | Refills: 0 | Status: SHIPPED | OUTPATIENT
Start: 2022-04-28 | End: 2022-05-08

## 2022-04-28 ASSESSMENT — ENCOUNTER SYMPTOMS
SINUS PAIN: 0
EYE PAIN: 0
VOICE CHANGE: 0
EYE DISCHARGE: 0
SORE THROAT: 0
TROUBLE SWALLOWING: 0
FACIAL SWELLING: 0
RHINORRHEA: 0
SINUS PRESSURE: 0

## 2022-04-28 NOTE — PROGRESS NOTES
Yaquelin Mcgarry is a pleasant 60-year-old  female that presents for complaints of left ear pain and dizziness. She reports the onset began about couple weeks ago and has not improved. She reports of muffled hearing as well as pain. She denies any drainage from external canal and denies any issues with fever or chills. Allergies: Pineapple, Ciprocinonide [fluocinolone], Citrus, and Elemental sulfur      Current Outpatient Medications   Medication Sig Dispense Refill    cefUROXime (CEFTIN) 500 MG tablet Take 1 tablet by mouth 2 times daily for 10 days 20 tablet 0    sodium chloride (OCEAN NASAL SPRAY) 0.65 % nasal spray 2 sprays by Nasal route 3 times daily 1 each 3    zinc sulfate (ZINCATE) 220 (50 Zn) MG capsule Take 50 mg by mouth daily      aspirin 81 MG EC tablet Take 81 mg by mouth daily      omeprazole (PRILOSEC) 20 MG delayed release capsule Take 1 capsule by mouth 2 times daily (before meals) 60 capsule 2    Multiple Vitamins-Minerals (THERAPEUTIC MULTIVITAMIN-MINERALS) tablet Take 1 tablet by mouth daily      ibuprofen (ADVIL;MOTRIN) 800 MG tablet Take 1 tablet by mouth every 6 hours as needed for Pain 120 tablet 3     No current facility-administered medications for this visit.        Past Surgical History:   Procedure Laterality Date    EYE SURGERY      FOOT SURGERY      SHAZIA AND BSO N/A 12/30/2015    HYSTERECTOMY ABDOMINAL TOTAL BILATERAL SALPINGO-OOPHORECTOMY; EXAM UNDER ANESTHESIA; AND CYSTOSCOPY  performed by Bahman Arredondo MD at [place] [address] THYROID BIOPSY  12/23/2021     THYROID BIOPSY 12/23/2021 [facility] ULTRASOUND       Past Medical History:   Diagnosis Date    Concussion     hx of; in high school    Left foot soft tissue tumor     Left leg DVT ([address]     Ovarian cyst     Retinal tear     Right eye    Tumor, thyroid     Dr. Ivana Camilo managing       Family History   Problem Relation Age of Onset    Other Mother         pulmonary embolism Social History     Tobacco Use    Smoking status: Never Smoker    Smokeless tobacco: Never Used   Substance Use Topics    Alcohol use: No     Alcohol/week: 0.0 standard drinks           REVIEW OF SYSTEMS:  all other systems reviewed and are negative  Review of Systems   Constitutional: Negative for chills and fever. HENT: Positive for congestion and postnasal drip. Negative for dental problem, ear discharge, ear pain, facial swelling, hearing loss, nosebleeds, rhinorrhea, sinus pressure, sinus pain, sneezing, sore throat, tinnitus, trouble swallowing and voice change. Eyes: Negative for pain and discharge. Neurological: Negative for dizziness and headaches. Comments:     PHYSICAL EXAM:    /78   Ht 5' 8\" (1.727 m)   Wt (!) 315 lb (142.9 kg)   BMI 47.90 kg/m²   Body mass index is 47.9 kg/m². General Appearance: well developed  and well nourished  Head/ Face: normocephalic and atraumatic  Vocal Quality: good/ normal  Ears: Right Ear: External: external ears normal Otoscopy Ear Canal: canal clear Otoscopy TM: TM's normal and TM's mobile Left Ear: External: external ears normal Otoscopy Ear Canal: canal clear Otoscopy TM: TM's dull, TM's sluggish and TM's erythematous  Hearing: grossly intact  Nose: nares normal and septum midline  Neck: supple and adenopathy none palpable  Thyroid: normal   Patient was noted with left maxillary sinus tenderness to percussion as well as left frontal tenderness to percussion. Right side was unremarkable. Assessment & Plan:    Problem List Items Addressed This Visit     Left non-suppurative otitis media - Primary     Left-sided otitis media  Plan: I will place the patient on Ceftin 500 mg twice a day for 10 days. We will have her follow-up in 2 weeks for reevaluation.          Relevant Medications    cefUROXime (CEFTIN) 500 MG tablet    Acute recurrent maxillary sinusitis     Chronic left-sided maxillary tenderness with history of sinusitis  Plan: I recommended a plain sinus x-ray for evaluation. I will call her the results with further recommendations to follow         Relevant Medications    cefUROXime (CEFTIN) 500 MG tablet    sodium chloride (OCEAN NASAL SPRAY) 0.65 % nasal spray    Other Relevant Orders    XR SINUSES (MIN 3 VIEWS ) (Completed)          Orders Placed This Encounter   Procedures    XR SINUSES (MIN 3 VIEWS )     Standing Status:   Future     Number of Occurrences:   1     Standing Expiration Date:   2023     Scheduling Instructions:      LMP     Order Specific Question:   Reason for exam:     Answer:   Recurrent maxillary sinusitis bilaterally       Orders Placed This Encounter   Medications    cefUROXime (CEFTIN) 500 MG tablet     Sig: Take 1 tablet by mouth 2 times daily for 10 days     Dispense:  20 tablet     Refill:  0    sodium chloride (OCEAN NASAL SPRAY) 0.65 % nasal spray     Si sprays by Nasal route 3 times daily     Dispense:  1 each     Refill:  3       Electronically signed by Hyun Chavarria PA-C on 22 at 1:25 PM CDT          Please note that this chart was generated using dragon dictation software. Although every effort was made to ensure the accuracy of this automated transcription, some errors in transcription may have occurred.

## 2022-04-28 NOTE — ASSESSMENT & PLAN NOTE
Chronic left-sided maxillary tenderness with history of sinusitis  Plan: I recommended a plain sinus x-ray for evaluation.   I will call her the results with further recommendations to follow

## 2022-04-28 NOTE — ASSESSMENT & PLAN NOTE
Left-sided otitis media  Plan: I will place the patient on Ceftin 500 mg twice a day for 10 days. We will have her follow-up in 2 weeks for reevaluation.

## 2022-04-29 ENCOUNTER — TELEPHONE (OUTPATIENT)
Dept: ENT CLINIC | Age: 67
End: 2022-04-29

## 2022-04-29 RX ORDER — ECHINACEA PURPUREA EXTRACT 125 MG
2 TABLET ORAL 3 TIMES DAILY
Qty: 1 EACH | Refills: 3 | Status: SHIPPED | OUTPATIENT
Start: 2022-04-29

## 2022-04-29 RX ORDER — OXYMETAZOLINE HYDROCHLORIDE 0.05 G/100ML
2 SPRAY NASAL 2 TIMES DAILY
Qty: 14 ML | Refills: 0 | Status: SHIPPED | OUTPATIENT
Start: 2022-04-29 | End: 2022-05-29

## 2022-04-29 NOTE — TELEPHONE ENCOUNTER
Test results called to pt. Patient was noted to have evidence of a middle ear effusion on plain films. I recommend treating this with Afrin nasal spray and Ocean nasal spray due to the patient being allergic to steroids. Patient to follow-up in 2 weeks for reevaluation.       Electronically signed by Savannah Triplett PA-C on 4/29/22 at 1:56 PM CDT

## 2022-05-09 ENCOUNTER — OFFICE VISIT (OUTPATIENT)
Dept: ENT CLINIC | Age: 67
End: 2022-05-09
Payer: MEDICARE

## 2022-05-09 VITALS
WEIGHT: 293 LBS | DIASTOLIC BLOOD PRESSURE: 76 MMHG | BODY MASS INDEX: 44.41 KG/M2 | SYSTOLIC BLOOD PRESSURE: 138 MMHG | HEIGHT: 68 IN

## 2022-05-09 DIAGNOSIS — H65.92 LEFT NON-SUPPURATIVE OTITIS MEDIA: Primary | ICD-10-CM

## 2022-05-09 PROCEDURE — 99213 OFFICE O/P EST LOW 20 MIN: CPT | Performed by: PHYSICIAN ASSISTANT

## 2022-05-09 PROCEDURE — 1036F TOBACCO NON-USER: CPT | Performed by: PHYSICIAN ASSISTANT

## 2022-05-09 PROCEDURE — 4040F PNEUMOC VAC/ADMIN/RCVD: CPT | Performed by: PHYSICIAN ASSISTANT

## 2022-05-09 PROCEDURE — 3017F COLORECTAL CA SCREEN DOC REV: CPT | Performed by: PHYSICIAN ASSISTANT

## 2022-05-09 PROCEDURE — G8400 PT W/DXA NO RESULTS DOC: HCPCS | Performed by: PHYSICIAN ASSISTANT

## 2022-05-09 PROCEDURE — 1090F PRES/ABSN URINE INCON ASSESS: CPT | Performed by: PHYSICIAN ASSISTANT

## 2022-05-09 PROCEDURE — 1123F ACP DISCUSS/DSCN MKR DOCD: CPT | Performed by: PHYSICIAN ASSISTANT

## 2022-05-09 PROCEDURE — G8427 DOCREV CUR MEDS BY ELIG CLIN: HCPCS | Performed by: PHYSICIAN ASSISTANT

## 2022-05-09 PROCEDURE — G8417 CALC BMI ABV UP PARAM F/U: HCPCS | Performed by: PHYSICIAN ASSISTANT

## 2022-05-09 NOTE — PROGRESS NOTES
Radha Sawant is a pleasant 51-year-old  that presents for a 2-week follow-up for otitis media of the left ear. Patient reports that her ear is feeling much improved with resolving vertigo. She reports that now she is able to hear better from the left ear. Examination with microscope revealed the patient to have resolved otitis media of the left ear. Patient was noted to have a decreased but improved mobility of about 50% to pneumonic exam.  Examination of the right ear demonstrated normal TM and normal mobility. Neck exam demonstrated no lymphadenopathy with tenderness over the left eustachian tube region and down the left anterior cervical chain. Oral exam was unrevealing. Impression: Resolved left-sided otitis media, improved left middle ear effusion    Plan: I advised the patient to continue her Afrin and Flonase for another 10 days. She is to follow-up in 2 weeks for reevaluation. Patient is also scheduled for a 6-month follow-up exam by ultrasound. I advised her that she could wait until June to have both entities looked at the same time.       Electronically signed by Ventura Alvarado PA-C on 5/9/22 at 11:50 AM ELY

## 2022-05-23 ENCOUNTER — OFFICE VISIT (OUTPATIENT)
Dept: ENT CLINIC | Age: 67
End: 2022-05-23
Payer: MEDICARE

## 2022-05-23 VITALS
HEIGHT: 68 IN | BODY MASS INDEX: 44.41 KG/M2 | SYSTOLIC BLOOD PRESSURE: 138 MMHG | DIASTOLIC BLOOD PRESSURE: 84 MMHG | WEIGHT: 293 LBS | TEMPERATURE: 97.8 F

## 2022-05-23 DIAGNOSIS — H65.92 LEFT NON-SUPPURATIVE OTITIS MEDIA: Primary | ICD-10-CM

## 2022-05-23 DIAGNOSIS — H69.83 EUSTACHIAN TUBE DYSFUNCTION, BILATERAL: ICD-10-CM

## 2022-05-23 PROCEDURE — G8400 PT W/DXA NO RESULTS DOC: HCPCS | Performed by: PHYSICIAN ASSISTANT

## 2022-05-23 PROCEDURE — G8427 DOCREV CUR MEDS BY ELIG CLIN: HCPCS | Performed by: PHYSICIAN ASSISTANT

## 2022-05-23 PROCEDURE — 3017F COLORECTAL CA SCREEN DOC REV: CPT | Performed by: PHYSICIAN ASSISTANT

## 2022-05-23 PROCEDURE — 99213 OFFICE O/P EST LOW 20 MIN: CPT | Performed by: PHYSICIAN ASSISTANT

## 2022-05-23 PROCEDURE — 1123F ACP DISCUSS/DSCN MKR DOCD: CPT | Performed by: PHYSICIAN ASSISTANT

## 2022-05-23 PROCEDURE — 1036F TOBACCO NON-USER: CPT | Performed by: PHYSICIAN ASSISTANT

## 2022-05-23 PROCEDURE — 1090F PRES/ABSN URINE INCON ASSESS: CPT | Performed by: PHYSICIAN ASSISTANT

## 2022-05-23 PROCEDURE — G8417 CALC BMI ABV UP PARAM F/U: HCPCS | Performed by: PHYSICIAN ASSISTANT

## 2022-05-23 NOTE — PROGRESS NOTES
Matilde Fallon is a pleasant 80-year-old  female that presents for a 2-week follow-up after treatment for eustachian tube dysfunction with left-sided otitis media. Patient reports that her ears feel much improved and denies any muffled hearing or any otalgia. She also denies any discharge to the ear canal.      Physical examination with the microscope revealed normal-appearing TMs and canals bilaterally with no evidence of infection or perforation. Neck exam demonstrated no lymphadenopathy or thyromegaly. Oral exam was unrevealing. Impression: Resolved bilateral eustachian tube dysfunction with left-sided otitis media    Plan: Patient was advised to see me in December for yearly thyroid exam due to repeat ultrasound scheduled at that time. She was reminded to call if she has any questions or problems.       Electronically signed by Arlen Thayer PA-C on 5/23/22 at 1:30 PM CDT

## 2022-07-04 NOTE — PROGRESS NOTES
Goal Outcome Evaluation:    Date & Time: 7/3/22 1801-2954  Diagnosis: Suicidal ideation   Procedures: NA  Orientation/Cognitive: AOx4   VS/O2: VSS, RA  Mobility: Independent   Diet: Regular   Pain Management: Voltaren gel, scheduled lidocaine patch until 2100, robaxin  Bowel & Bladder: Continent   Skin: WDL   Abnormal Labs: WDL  Tele: NA  IV Access/Drips/Fluids: L PIV SL   Drains: NA  Tests: NA  Consults: none this shift - previous= Psych, hospitalist   Discharge Plan: Pending - IP psych, needs 20 days of isolation d/t immunocompromised - over 7/11  Other: No SI reported, safety checks done, sitter at bedside, asymptomatic for COVID - currently on hold                     Ms. Lindbergh Leventhal is a pleasant 15-year-old  female that presents for a 6-week follow-up after treatment for her LPR. She reports that her cough and has decreased to about 50% and seems to be improving. She also reports that she does not feel like something is stuck in her throat. She continues to deny any dysphagia to include solids or liquids. She also denies any vocal hoarseness. Physical examination revealed the patient had normal-appearing TMs and canals bilaterally. Oral exam demonstrated no erythema to the posterior pharyngeal wall. Neck exam demonstrated no lymphadenopathy. She was noted to have tenderness appreciated to the right lateral region of the thyroid with evidence of a thyroid nodule that was preestablished. Impression: Clinically improving LPR    Plan: I recommended continue the PPI therapy twice a day for another 6 weeks. She is follow-up in 6 weeks for reevaluation. She is also scheduled for an ultrasound-guided biopsy of the right thyroid nodule in January. I will call her the results once this has been completed.       Electronically signed by Lewis Oconnell PA-C on 12/17/21 at 3:19 PM CST

## 2023-01-04 ENCOUNTER — HOSPITAL ENCOUNTER (OUTPATIENT)
Dept: ULTRASOUND IMAGING | Age: 68
Discharge: HOME | End: 2023-01-04
Payer: MEDICARE

## 2023-01-04 DIAGNOSIS — E04.1 THYROID NODULE: ICD-10-CM

## 2023-01-04 PROCEDURE — 76536 US EXAM OF HEAD AND NECK: CPT | Performed by: RADIOLOGY

## 2023-01-04 PROCEDURE — 76536 US EXAM OF HEAD AND NECK: CPT

## 2023-01-09 ENCOUNTER — OFFICE VISIT (OUTPATIENT)
Dept: ENT CLINIC | Age: 68
End: 2023-01-09
Payer: MEDICARE

## 2023-01-09 VITALS
BODY MASS INDEX: 44.41 KG/M2 | SYSTOLIC BLOOD PRESSURE: 138 MMHG | WEIGHT: 293 LBS | HEIGHT: 68 IN | DIASTOLIC BLOOD PRESSURE: 76 MMHG

## 2023-01-09 DIAGNOSIS — H61.23 BILATERAL IMPACTED CERUMEN: ICD-10-CM

## 2023-01-09 DIAGNOSIS — E04.2 MULTINODULAR THYROID: ICD-10-CM

## 2023-01-09 DIAGNOSIS — E04.2 MULTINODULAR THYROID: Primary | ICD-10-CM

## 2023-01-09 DIAGNOSIS — H60.543 ECZEMA OF EXTERNAL EAR, BILATERAL: ICD-10-CM

## 2023-01-09 LAB
T4 FREE: 1.02 NG/DL (ref 0.93–1.7)
TESTOSTERONE TOTAL: 14.7 NG/DL (ref 2.9–40.8)
TSH SERPL DL<=0.05 MIU/L-ACNC: 2.13 UIU/ML (ref 0.27–4.2)

## 2023-01-09 PROCEDURE — G8484 FLU IMMUNIZE NO ADMIN: HCPCS | Performed by: PHYSICIAN ASSISTANT

## 2023-01-09 PROCEDURE — 1090F PRES/ABSN URINE INCON ASSESS: CPT | Performed by: PHYSICIAN ASSISTANT

## 2023-01-09 PROCEDURE — 4130F TOPICAL PREP RX AOE: CPT | Performed by: PHYSICIAN ASSISTANT

## 2023-01-09 PROCEDURE — 1036F TOBACCO NON-USER: CPT | Performed by: PHYSICIAN ASSISTANT

## 2023-01-09 PROCEDURE — G8400 PT W/DXA NO RESULTS DOC: HCPCS | Performed by: PHYSICIAN ASSISTANT

## 2023-01-09 PROCEDURE — 1123F ACP DISCUSS/DSCN MKR DOCD: CPT | Performed by: PHYSICIAN ASSISTANT

## 2023-01-09 PROCEDURE — 69210 REMOVE IMPACTED EAR WAX UNI: CPT | Performed by: PHYSICIAN ASSISTANT

## 2023-01-09 PROCEDURE — 3017F COLORECTAL CA SCREEN DOC REV: CPT | Performed by: PHYSICIAN ASSISTANT

## 2023-01-09 PROCEDURE — 99213 OFFICE O/P EST LOW 20 MIN: CPT | Performed by: PHYSICIAN ASSISTANT

## 2023-01-09 PROCEDURE — G8417 CALC BMI ABV UP PARAM F/U: HCPCS | Performed by: PHYSICIAN ASSISTANT

## 2023-01-09 PROCEDURE — G8427 DOCREV CUR MEDS BY ELIG CLIN: HCPCS | Performed by: PHYSICIAN ASSISTANT

## 2023-01-09 RX ORDER — MOMETASONE FUROATE 1 MG/G
CREAM TOPICAL
Qty: 15 G | Refills: 2 | Status: SHIPPED | OUTPATIENT
Start: 2023-01-09

## 2023-01-09 ASSESSMENT — ENCOUNTER SYMPTOMS
RHINORRHEA: 0
SORE THROAT: 0
FACIAL SWELLING: 0
EYE PAIN: 0
SINUS PRESSURE: 0
SINUS PAIN: 0
TROUBLE SWALLOWING: 0
EYE DISCHARGE: 0
VOICE CHANGE: 0

## 2023-01-09 NOTE — ASSESSMENT & PLAN NOTE
Multinodular thyroid-currently stable on today's physical exam with ultrasound pending  Plan: I will call the ultrasound results to the patient with further recommendations to follow. I have also ordered thyroid lab panel for evaluation as well as a serum testosterone level due to her history of hair loss.

## 2023-01-09 NOTE — ASSESSMENT & PLAN NOTE
Bilateral cerumen impaction-successfully removed with microscopic guidance  Plan: Patient is follow-up in 6 months for cerumen check.

## 2023-01-09 NOTE — PROGRESS NOTES
Elaine Douglas is a pleasant 57-year-old  female that presents for evaluation of a multinodular thyroid. Patient reports that she has noticed increased tenderness from the left lobe of the thyroid but denies any dysphagia to include solids or liquids. She also complains of increased hair loss. The ultrasound of her thyroid was repeated about a week ago and was reviewed with the patient. Patient was noted with 2 thyroid nodules that appear to be stable with no significant changes. At this point she has had no thyroid lab performed. Patient also is noted with complaints of itchy ears and is requesting evaluation. Allergies: Pineapple, Ciprocinonide [fluocinolone], Citrus, and Elemental sulfur      Current Outpatient Medications   Medication Sig Dispense Refill    mometasone (ELOCON) 0.1 % cream Apply small amount nightly to opening of external ear canal.  Continue to use nightly until symptoms of dryness and itching improve. Then may taper frequency of use to minimum necessary to control symptoms 15 g 2    sodium chloride (OCEAN NASAL SPRAY) 0.65 % nasal spray 2 sprays by Nasal route 3 times daily 1 each 3    zinc sulfate (ZINCATE) 220 (50 Zn) MG capsule Take 50 mg by mouth daily      aspirin 81 MG EC tablet Take 81 mg by mouth daily      omeprazole (PRILOSEC) 20 MG delayed release capsule Take 1 capsule by mouth 2 times daily (before meals) 60 capsule 2    Multiple Vitamins-Minerals (THERAPEUTIC MULTIVITAMIN-MINERALS) tablet Take 1 tablet by mouth daily      ibuprofen (ADVIL;MOTRIN) 800 MG tablet Take 1 tablet by mouth every 6 hours as needed for Pain 120 tablet 3     No current facility-administered medications for this visit.        Past Surgical History:   Procedure Laterality Date    EYE SURGERY      FOOT SURGERY      SHAZIA AND BSO (CERVIX REMOVED) N/A 12/30/2015    HYSTERECTOMY ABDOMINAL TOTAL BILATERAL SALPINGO-OOPHORECTOMY; EXAM UNDER ANESTHESIA; AND CYSTOSCOPY performed by Benita Rojas MD at [address]  12/23/2021     THYROID BIOPSY 12/23/2021 [facility] ULTRASOUND       Past Medical History:   Diagnosis Date    Concussion     hx of; in high school    Left foot soft tissue tumor     Left leg DVT ([address]     Ovarian cyst     Retinal tear     Right eye    Tumor, thyroid     Dr. Aileen Trinidad managing       Family History   Problem Relation Age of Onset    Other Mother         pulmonary embolism       Social History     Tobacco Use    Smoking status: Never    Smokeless tobacco: Never   Substance Use Topics    Alcohol use: No     Alcohol/week: 0.0 standard drinks           REVIEW OF SYSTEMS:  all other systems reviewed and are negative  Review of Systems   Constitutional:  Negative for chills and fever. HENT:  Negative for congestion, dental problem, ear discharge, ear pain, facial swelling, hearing loss, nosebleeds, postnasal drip, rhinorrhea, sinus pressure, sinus pain, sneezing, sore throat, tinnitus, trouble swallowing and voice change. Eyes:  Negative for pain and discharge. Neurological:  Negative for dizziness and headaches. Comments:     PHYSICAL EXAM:    /76   Ht 5' 8\" (1.727 m)   Wt (!) 310 lb (140.6 kg)   BMI 47.14 kg/m²   Body mass index is 47.14 kg/m². General Appearance: well developed  and well nourished  Head/ Face: normocephalic and atraumatic  Vocal Quality: good/ normal  Ears: Right Ear: External: external ears normal Otoscopy Ear Canal: cerumen impaction Otoscopy TM: TM's normal and TM's mobile Left Ear: External: external ears normal Otoscopy Ear Canal: cerumen impaction Otoscopy TM: TM's normal and TM's mobile  Hearing: grossly intact  Nose: nares normal and septum midline  Neck: supple and adenopathy none palpable  Thyroid: tender Yes and Left and nodules No  Exam demonstrated the tongue to be midline with no abnormalities to the posterior pharynx.   Patient was noted to have a single reactive lymph node to the left anterior cervical region. Patient reports of having recent oral surgery which was felt to be the contributing factor. Assessment & Plan:    Problem List Items Addressed This Visit       Eczema of external ear, bilateral    Multinodular thyroid - Primary     Multinodular thyroid-currently stable on today's physical exam with ultrasound pending  Plan: I will call the ultrasound results to the patient with further recommendations to follow. I have also ordered thyroid lab panel for evaluation as well as a serum testosterone level due to her history of hair loss. Relevant Orders    TSH    T4, Free    Testosterone    Bilateral impacted cerumen      Bilateral cerumen impaction-successfully removed with microscopic guidance  Plan: Patient is follow-up in 6 months for cerumen check. Relevant Orders    29997 - [place] REMOVE IMPACTED EAR WAX (Completed)       Orders Placed This Encounter   Procedures    TSH     Standing Status:   Future     Number of Occurrences:   1     Standing Expiration Date:   1/9/2024    T4, Free     Standing Status:   Future     Number of Occurrences:   1     Standing Expiration Date:   1/9/2024    Testosterone     Standing Status:   Future     Number of Occurrences:   1     Standing Expiration Date:   1/9/2024    08480 - [place] REMOVE IMPACTED EAR WAX     With microscopic guidance, the cerumen impaction was removed bilateral assistance of alligator graspers and suction. After disimpaction the TMs appear to be normal.  Patient was noted to have evidence of eczema of the ear canal bilaterally. Orders Placed This Encounter   Medications    mometasone (ELOCON) 0.1 % cream     Sig: Apply small amount nightly to opening of external ear canal.  Continue to use nightly until symptoms of dryness and itching improve.   Then may taper frequency of use to minimum necessary to control symptoms     Dispense:  15 g     Refill:  2       Electronically signed by Delmy Mcmillan PA-C on 1/9/23 at 2:49 PM CST        Please note that this chart was generated using dragon dictation software. Although every effort was made to ensure the accuracy of this automated transcription, some errors in transcription may have occurred.

## 2023-01-11 ENCOUNTER — TELEPHONE (OUTPATIENT)
Dept: ENT CLINIC | Age: 68
End: 2023-01-11

## 2023-01-12 NOTE — TELEPHONE ENCOUNTER
I called the results of the thyroid panel and serum testosterone. All were noted to be within normal limits. Patient is to follow-up after her ultrasound thyroid for further evaluation. She was reminded to call if she has any questions or problems.       Electronically signed by Federica Thompson PA-C on 1/11/23 at 6:18 PM CST

## 2023-09-20 ENCOUNTER — OFFICE VISIT (OUTPATIENT)
Dept: ENT CLINIC | Age: 68
End: 2023-09-20
Payer: MEDICARE

## 2023-09-20 ENCOUNTER — HOSPITAL ENCOUNTER (OUTPATIENT)
Dept: CT IMAGING | Age: 68
Discharge: HOME | End: 2023-09-20
Payer: MEDICARE

## 2023-09-20 VITALS
WEIGHT: 293 LBS | HEIGHT: 68 IN | DIASTOLIC BLOOD PRESSURE: 78 MMHG | SYSTOLIC BLOOD PRESSURE: 138 MMHG | BODY MASS INDEX: 44.41 KG/M2

## 2023-09-20 DIAGNOSIS — R22.1 NECK MASS: ICD-10-CM

## 2023-09-20 DIAGNOSIS — R22.1 NECK MASS: Primary | ICD-10-CM

## 2023-09-20 PROCEDURE — 1090F PRES/ABSN URINE INCON ASSESS: CPT | Performed by: PHYSICIAN ASSISTANT

## 2023-09-20 PROCEDURE — 1123F ACP DISCUSS/DSCN MKR DOCD: CPT | Performed by: PHYSICIAN ASSISTANT

## 2023-09-20 PROCEDURE — G8417 CALC BMI ABV UP PARAM F/U: HCPCS | Performed by: PHYSICIAN ASSISTANT

## 2023-09-20 PROCEDURE — G8427 DOCREV CUR MEDS BY ELIG CLIN: HCPCS | Performed by: PHYSICIAN ASSISTANT

## 2023-09-20 PROCEDURE — 1036F TOBACCO NON-USER: CPT | Performed by: PHYSICIAN ASSISTANT

## 2023-09-20 PROCEDURE — G8400 PT W/DXA NO RESULTS DOC: HCPCS | Performed by: PHYSICIAN ASSISTANT

## 2023-09-20 PROCEDURE — 70490 CT SOFT TISSUE NECK W/O DYE: CPT

## 2023-09-20 PROCEDURE — 99213 OFFICE O/P EST LOW 20 MIN: CPT | Performed by: PHYSICIAN ASSISTANT

## 2023-09-20 PROCEDURE — 3017F COLORECTAL CA SCREEN DOC REV: CPT | Performed by: PHYSICIAN ASSISTANT

## 2023-09-20 PROCEDURE — 31575 DIAGNOSTIC LARYNGOSCOPY: CPT | Performed by: PHYSICIAN ASSISTANT

## 2023-09-20 ASSESSMENT — ENCOUNTER SYMPTOMS
SINUS PRESSURE: 0
SINUS PAIN: 0
VOICE CHANGE: 0
FACIAL SWELLING: 0
EYE PAIN: 0
EYE DISCHARGE: 0
RHINORRHEA: 0
SORE THROAT: 0
TROUBLE SWALLOWING: 0

## 2023-09-20 NOTE — PROGRESS NOTES
Devon Patrick is a pleasant 78-year-old  female that I previously seen due to problems with LPR and a multinodular thyroid. She reports that a week ago she was eating pork grinds and got choked to the point she almost experienced syncope. She was able to cough the foreign body out without any complications. She reports that over the last several weeks she has noticed increasing problems with dysphagia with solid foods. She feels a fullness to the right anterior cervical region and has several palpable abnormalities to the right lateral neck that have been sore. She denies any issues with fever, chills, night sweats, or any additional problems. Allergies: Pineapple, Ciprocinonide [fluocinolone], Citrus, and Elemental sulfur      Current Outpatient Medications   Medication Sig Dispense Refill    mometasone (ELOCON) 0.1 % cream Apply small amount nightly to opening of external ear canal.  Continue to use nightly until symptoms of dryness and itching improve. Then may taper frequency of use to minimum necessary to control symptoms 15 g 2    sodium chloride (OCEAN NASAL SPRAY) 0.65 % nasal spray 2 sprays by Nasal route 3 times daily 1 each 3    zinc sulfate (ZINCATE) 220 (50 Zn) MG capsule Take 1 capsule by mouth daily      aspirin 81 MG EC tablet Take 1 tablet by mouth daily      omeprazole (PRILOSEC) 20 MG delayed release capsule Take 1 capsule by mouth 2 times daily (before meals) 60 capsule 2    Multiple Vitamins-Minerals (THERAPEUTIC MULTIVITAMIN-MINERALS) tablet Take 1 tablet by mouth daily      ibuprofen (ADVIL;MOTRIN) 800 MG tablet Take 1 tablet by mouth every 6 hours as needed for Pain 120 tablet 3     No current facility-administered medications for this visit.        Past Surgical History:   Procedure Laterality Date    EYE SURGERY      FOOT SURGERY      SHAZIA AND BSO (CERVIX REMOVED) N/A 12/30/2015    HYSTERECTOMY ABDOMINAL TOTAL BILATERAL SALPINGO-OOPHORECTOMY; EXAM

## 2023-09-20 NOTE — ASSESSMENT & PLAN NOTE
Worsening dysphagia with fullness to the right anterior cervical region-questionably secondary to enlarging right thyroid goiter versus significant lymphadenopathy    Plan: I have recommended a CT of the neck for evaluation of the anterior cervical region. I will call her the results with further recommendations to follow.   If the CT is negative, we will get an esophagram.

## 2023-09-21 ENCOUNTER — TELEPHONE (OUTPATIENT)
Dept: ENT CLINIC | Age: 68
End: 2023-09-21

## 2023-09-21 DIAGNOSIS — E04.2 MULTINODULAR THYROID: Primary | ICD-10-CM

## 2023-09-21 NOTE — TELEPHONE ENCOUNTER
I called the patient the CT results of the neck after review. Patient is noted with a questionably enlarging right thyroid nodule that may be causing her dysphagia. I recommended getting an ultrasound for further evaluation. Incidentally she was found to have pulmonary nodules present with some mild mediastinal lymph nodes mildly enlarged. A CT of the chest was recommended. I advised the patient that she can follow-up with Dr. Andrea Perdomo on the pulmonary work-up. Patient is a non-smoker. Orders placed for the ultrasound. I will call the results once has been completed.     Electronically signed by Luis Eduardo Quinteros PA-C on 9/21/23 at 5:43 PM CDT

## 2023-09-22 ENCOUNTER — TELEPHONE (OUTPATIENT)
Dept: PRIMARY CARE CLINIC | Age: 68
End: 2023-09-22

## 2023-09-22 NOTE — TELEPHONE ENCOUNTER
Pt called to request an appointment with Dr. James Carmen to discuss ENT visit. Looking back pt has only seen him once and that was in 2018. I tried calling her back but no answer and no vm.  She will need to establish with another provider when/if she calls back

## 2024-05-30 ENCOUNTER — OFFICE VISIT (OUTPATIENT)
Dept: ENT CLINIC | Age: 69
End: 2024-05-30
Payer: MEDICARE

## 2024-05-30 VITALS
HEIGHT: 68 IN | BODY MASS INDEX: 44.41 KG/M2 | WEIGHT: 293 LBS | SYSTOLIC BLOOD PRESSURE: 130 MMHG | DIASTOLIC BLOOD PRESSURE: 82 MMHG

## 2024-05-30 DIAGNOSIS — R13.10 DYSPHAGIA, UNSPECIFIED TYPE: Primary | ICD-10-CM

## 2024-05-30 PROCEDURE — 1036F TOBACCO NON-USER: CPT | Performed by: PHYSICIAN ASSISTANT

## 2024-05-30 PROCEDURE — 3017F COLORECTAL CA SCREEN DOC REV: CPT | Performed by: PHYSICIAN ASSISTANT

## 2024-05-30 PROCEDURE — G8417 CALC BMI ABV UP PARAM F/U: HCPCS | Performed by: PHYSICIAN ASSISTANT

## 2024-05-30 PROCEDURE — 99213 OFFICE O/P EST LOW 20 MIN: CPT | Performed by: PHYSICIAN ASSISTANT

## 2024-05-30 PROCEDURE — 1090F PRES/ABSN URINE INCON ASSESS: CPT | Performed by: PHYSICIAN ASSISTANT

## 2024-05-30 PROCEDURE — G8400 PT W/DXA NO RESULTS DOC: HCPCS | Performed by: PHYSICIAN ASSISTANT

## 2024-05-30 PROCEDURE — 1123F ACP DISCUSS/DSCN MKR DOCD: CPT | Performed by: PHYSICIAN ASSISTANT

## 2024-05-30 PROCEDURE — 31575 DIAGNOSTIC LARYNGOSCOPY: CPT | Performed by: PHYSICIAN ASSISTANT

## 2024-05-30 PROCEDURE — G8427 DOCREV CUR MEDS BY ELIG CLIN: HCPCS | Performed by: PHYSICIAN ASSISTANT

## 2024-05-30 ASSESSMENT — ENCOUNTER SYMPTOMS
EYE DISCHARGE: 0
SINUS PRESSURE: 0
RHINORRHEA: 0
VOICE CHANGE: 0
TROUBLE SWALLOWING: 0
SORE THROAT: 0
FACIAL SWELLING: 0
EYE PAIN: 0
SINUS PAIN: 0

## 2024-05-30 NOTE — ASSESSMENT & PLAN NOTE
Worsening dysphagia-questionable distal esophageal stricture versus gastric outlet obstruction from intra-abdominal tumors  Plan: I have recommended an esophagram with an upper GI series.  I will call her the results with further recommendations to follow.  If her symptoms continue, we will consider referring her to the gastroenterologist for EGD.

## 2024-05-30 NOTE — PROGRESS NOTES
[facility] OTOLARYNGOLOGY/ENT  Ellen is a pleasant 68-year-old  female that represents to the clinic with complaints of worsening dysphagia.  She reports that 2 nights ago she got choked on a piece of meat and felt like she was going to die because of the obstruction.  She reports that this is occurring at least once or twice a day.  Due to this substantially worsening, she is requesting evaluation.  Patient admits to having intra-abdominal masses that have been growing and feels like this may be contributing to the dysphagia.        Allergies: Pineapple, Ciprocinonide [fluocinolone], Citrus, and Elemental sulfur      Current Outpatient Medications   Medication Sig Dispense Refill    mometasone (ELOCON) 0.1 % cream Apply small amount nightly to opening of external ear canal.  Continue to use nightly until symptoms of dryness and itching improve.  Then may taper frequency of use to minimum necessary to control symptoms 15 g 2    sodium chloride (OCEAN NASAL SPRAY) 0.65 % nasal spray 2 sprays by Nasal route 3 times daily 1 each 3    zinc sulfate (ZINCATE) 220 (50 Zn) MG capsule Take 1 capsule by mouth daily      aspirin 81 MG EC tablet Take 1 tablet by mouth daily      omeprazole (PRILOSEC) 20 MG delayed release capsule Take 1 capsule by mouth 2 times daily (before meals) 60 capsule 2    Multiple Vitamins-Minerals (THERAPEUTIC MULTIVITAMIN-MINERALS) tablet Take 1 tablet by mouth daily      ibuprofen (ADVIL;MOTRIN) 800 MG tablet Take 1 tablet by mouth every 6 hours as needed for Pain 120 tablet 3     No current facility-administered medications for this visit.       Past Surgical History:   Procedure Laterality Date    EYE SURGERY      FOOT SURGERY      SHAZIA AND BSO (CERVIX REMOVED) N/A 12/30/2015    HYSTERECTOMY ABDOMINAL TOTAL BILATERAL SALPINGO-OOPHORECTOMY; EXAM UNDER ANESTHESIA; AND CYSTOSCOPY  performed by Lori Pérez MD at [facility] OR     THYROID BIOPSY  12/23/2021    US THYROID BIOPSY 12/23/2021

## 2024-06-17 ENCOUNTER — HOSPITAL ENCOUNTER (OUTPATIENT)
Dept: GENERAL RADIOLOGY | Age: 69
Discharge: HOME | End: 2024-06-17
Payer: MEDICARE

## 2024-06-17 ENCOUNTER — TELEPHONE (OUTPATIENT)
Dept: ENT CLINIC | Age: 69
End: 2024-06-17

## 2024-06-17 DIAGNOSIS — R13.10 DYSPHAGIA, UNSPECIFIED TYPE: ICD-10-CM

## 2024-06-17 DIAGNOSIS — R13.10 DYSPHAGIA, UNSPECIFIED TYPE: Primary | ICD-10-CM

## 2024-06-17 PROCEDURE — 74240 X-RAY XM UPR GI TRC 1CNTRST: CPT

## 2024-06-17 NOTE — TELEPHONE ENCOUNTER
Attempted to call the patient the results of the upper GI.  This was negative for an esophageal stricture but was positive for a Schatzki's ring.  Due to the patient having dysphagia, I recommend referring her to GI for EGD. - orders placed.      Electronically signed by CATHERINE BANEGAS PA-C on 6/17/24 at 4:35 PM CDT

## 2024-06-27 ENCOUNTER — TELEPHONE (OUTPATIENT)
Dept: ENT CLINIC | Age: 69
End: 2024-06-27

## 2024-06-28 ENCOUNTER — TELEPHONE (OUTPATIENT)
Dept: ENT CLINIC | Age: 69
End: 2024-06-28

## 2024-06-28 NOTE — TELEPHONE ENCOUNTER
I called the patient and talked to her about the results of the upper GI.  This did not demonstrate an obvious esophageal stricture despite high suspicion based on history.  She was noted to have significant reflux to be present on the upper GI.  Due to her overall symptoms, [facility] and possible dilatation patient is agreeable and wishes to proceed.  I will see her back 6 weeks after the EGD for further evaluation.      Electronically signed by CATHERINE BANEGAS PA-C on 6/28/24 at 2:18 PM ELY

## 2024-07-24 ENCOUNTER — OFFICE VISIT (OUTPATIENT)
Dept: GASTROENTEROLOGY | Age: 69
End: 2024-07-24
Payer: MEDICARE

## 2024-07-24 VITALS
WEIGHT: 293 LBS | OXYGEN SATURATION: 97 % | SYSTOLIC BLOOD PRESSURE: 155 MMHG | DIASTOLIC BLOOD PRESSURE: 90 MMHG | HEART RATE: 100 BPM | HEIGHT: 68 IN | BODY MASS INDEX: 44.41 KG/M2

## 2024-07-24 DIAGNOSIS — K21.9 CHRONIC GERD: ICD-10-CM

## 2024-07-24 DIAGNOSIS — Z12.11 COLON CANCER SCREENING: Primary | ICD-10-CM

## 2024-07-24 DIAGNOSIS — R13.10 DYSPHAGIA, UNSPECIFIED TYPE: ICD-10-CM

## 2024-07-24 PROCEDURE — G8417 CALC BMI ABV UP PARAM F/U: HCPCS | Performed by: NURSE PRACTITIONER

## 2024-07-24 PROCEDURE — G8400 PT W/DXA NO RESULTS DOC: HCPCS | Performed by: NURSE PRACTITIONER

## 2024-07-24 PROCEDURE — 3017F COLORECTAL CA SCREEN DOC REV: CPT | Performed by: NURSE PRACTITIONER

## 2024-07-24 PROCEDURE — 1123F ACP DISCUSS/DSCN MKR DOCD: CPT | Performed by: NURSE PRACTITIONER

## 2024-07-24 PROCEDURE — G8427 DOCREV CUR MEDS BY ELIG CLIN: HCPCS | Performed by: NURSE PRACTITIONER

## 2024-07-24 PROCEDURE — 1036F TOBACCO NON-USER: CPT | Performed by: NURSE PRACTITIONER

## 2024-07-24 PROCEDURE — 99204 OFFICE O/P NEW MOD 45 MIN: CPT | Performed by: NURSE PRACTITIONER

## 2024-07-24 PROCEDURE — 1090F PRES/ABSN URINE INCON ASSESS: CPT | Performed by: NURSE PRACTITIONER

## 2024-07-24 RX ORDER — ACETAMINOPHEN 160 MG
1 TABLET,DISINTEGRATING ORAL DAILY
COMMUNITY

## 2024-07-24 ASSESSMENT — ENCOUNTER SYMPTOMS
CONSTIPATION: 0
TROUBLE SWALLOWING: 1
DIARRHEA: 0
ANAL BLEEDING: 0
NAUSEA: 0
VOMITING: 0
ABDOMINAL PAIN: 0
COUGH: 0
RECTAL PAIN: 0
SHORTNESS OF BREATH: 0
ABDOMINAL DISTENTION: 0
CHOKING: 0
BLOOD IN STOOL: 0

## 2024-07-24 NOTE — PROGRESS NOTES
Subjective:     Patient ID: Ellen Villa is a 68 y.o. female  PCP: Unknown, Provider, ALFREDO - NP  Referring Provider: Kermit Greenwood PA*    HPI  Patient presents to the office today with the following complaints: New Patient and Dysphagia      Pt seen today for c/o dysphagia.  \"I get choked.\"  This is foods and liquids.  Symptoms began a couple of months ago.  She has c/o dry mouth down in her chest.  \"Bone dry.\"  She denies any nausea or vomiting.  She states foods will get stuck.       Patient denies any lower GI symptoms such as constipation, diarrhea, change in bowel habits, rectal bleeding, and rectal pain.    She has never had EGD or Colonoscopy    Denies any family hx colon cancer/colon polyps      Assessment:     1. Colon cancer screening    2. Chronic GERD    3. Dysphagia, unspecified type              Plan:   - Schedule EGD  Nothing to eat or drink after midnight.  No driving for 24 hours after procedure. Bring a  to procedure.  No aspirin, NSAIDs, fish oil 5 days before procedure.  I have discussed the benefits, alternatives, and risks (including bleeding, perforation and death)  for pursuing Endoscopy (EGD/Colonscopy/EUS/ERCP) with the patient and they are willing to continue. We also discussed the need for anesthesia, IV access, proper dietary changes, medication changes if necessary, and need for bowel prep (if ordered) prior to their Endoscopic procedure.  They are aware they must have someone accompany them to their scheduled procedure to drive them home - they agree to the above and are willing to continue.         Orders  Orders Placed This Encounter   Procedures    Fecal DNA Colorectal cancer screening (Cologuard)     Medications  No orders of the defined types were placed in this encounter.        Patient History:     Past Medical History:   Diagnosis Date    Choking     Concussion     hx of; in high school    Left foot soft tissue tumor     Left leg DVT (HCC)     Ovarian cyst

## 2024-07-29 ENCOUNTER — ANESTHESIA (OUTPATIENT)
Dept: ENDOSCOPY | Age: 69
End: 2024-07-29
Payer: MEDICARE

## 2024-07-29 ENCOUNTER — HOSPITAL ENCOUNTER (OUTPATIENT)
Age: 69
Setting detail: OUTPATIENT SURGERY
Discharge: HOME | End: 2024-07-29
Attending: INTERNAL MEDICINE | Admitting: INTERNAL MEDICINE
Payer: MEDICARE

## 2024-07-29 ENCOUNTER — ANESTHESIA EVENT (OUTPATIENT)
Dept: ENDOSCOPY | Age: 69
End: 2024-07-29
Payer: MEDICARE

## 2024-07-29 VITALS
HEIGHT: 67 IN | TEMPERATURE: 97.5 F | OXYGEN SATURATION: 97 % | WEIGHT: 293 LBS | HEART RATE: 90 BPM | DIASTOLIC BLOOD PRESSURE: 71 MMHG | SYSTOLIC BLOOD PRESSURE: 138 MMHG | RESPIRATION RATE: 20 BRPM | BODY MASS INDEX: 45.99 KG/M2

## 2024-07-29 DIAGNOSIS — R13.10 DYSPHAGIA, UNSPECIFIED TYPE: ICD-10-CM

## 2024-07-29 PROCEDURE — 43239 EGD BIOPSY SINGLE/MULTIPLE: CPT | Performed by: INTERNAL MEDICINE

## 2024-07-29 PROCEDURE — 88305 TISSUE EXAM BY PATHOLOGIST: CPT

## 2024-07-29 PROCEDURE — 3609012400 HC EGD TRANSORAL BIOPSY SINGLE/MULTIPLE: Performed by: INTERNAL MEDICINE

## 2024-07-29 PROCEDURE — 6360000002 HC RX W HCPCS: Performed by: NURSE ANESTHETIST, CERTIFIED REGISTERED

## 2024-07-29 PROCEDURE — 3700000001 HC ADD 15 MINUTES (ANESTHESIA): Performed by: INTERNAL MEDICINE

## 2024-07-29 PROCEDURE — 7100000010 HC PHASE II RECOVERY - FIRST 15 MIN: Performed by: INTERNAL MEDICINE

## 2024-07-29 PROCEDURE — 43450 DILATE ESOPHAGUS 1/MULT PASS: CPT | Performed by: INTERNAL MEDICINE

## 2024-07-29 PROCEDURE — 3700000000 HC ANESTHESIA ATTENDED CARE: Performed by: INTERNAL MEDICINE

## 2024-07-29 PROCEDURE — 2709999900 HC NON-CHARGEABLE SUPPLY: Performed by: INTERNAL MEDICINE

## 2024-07-29 PROCEDURE — 2500000003 HC RX 250 WO HCPCS: Performed by: NURSE ANESTHETIST, CERTIFIED REGISTERED

## 2024-07-29 PROCEDURE — 3609017700 HC EGD DILATION GASTRIC/DUODENAL STRICTURE: Performed by: INTERNAL MEDICINE

## 2024-07-29 PROCEDURE — 2580000003 HC RX 258: Performed by: INTERNAL MEDICINE

## 2024-07-29 PROCEDURE — 7100000011 HC PHASE II RECOVERY - ADDTL 15 MIN: Performed by: INTERNAL MEDICINE

## 2024-07-29 RX ORDER — LIDOCAINE HYDROCHLORIDE 10 MG/ML
INJECTION, SOLUTION EPIDURAL; INFILTRATION; INTRACAUDAL; PERINEURAL PRN
Status: DISCONTINUED | OUTPATIENT
Start: 2024-07-29 | End: 2024-07-29 | Stop reason: SDUPTHER

## 2024-07-29 RX ORDER — FENTANYL CITRATE 50 UG/ML
INJECTION, SOLUTION INTRAMUSCULAR; INTRAVENOUS PRN
Status: DISCONTINUED | OUTPATIENT
Start: 2024-07-29 | End: 2024-07-29 | Stop reason: SDUPTHER

## 2024-07-29 RX ORDER — SODIUM CHLORIDE, SODIUM LACTATE, POTASSIUM CHLORIDE, CALCIUM CHLORIDE 600; 310; 30; 20 MG/100ML; MG/100ML; MG/100ML; MG/100ML
INJECTION, SOLUTION INTRAVENOUS CONTINUOUS
Status: DISCONTINUED | OUTPATIENT
Start: 2024-07-29 | End: 2024-07-29 | Stop reason: HOSPADM

## 2024-07-29 RX ORDER — PROPOFOL 10 MG/ML
INJECTION, EMULSION INTRAVENOUS PRN
Status: DISCONTINUED | OUTPATIENT
Start: 2024-07-29 | End: 2024-07-29 | Stop reason: SDUPTHER

## 2024-07-29 RX ADMIN — PROPOFOL 300 MG: 10 INJECTION, EMULSION INTRAVENOUS at 13:01

## 2024-07-29 RX ADMIN — FENTANYL CITRATE 50 MCG: 50 INJECTION INTRAMUSCULAR; INTRAVENOUS at 13:03

## 2024-07-29 RX ADMIN — LIDOCAINE HYDROCHLORIDE 50 MG: 10 INJECTION, SOLUTION EPIDURAL; INFILTRATION; INTRACAUDAL; PERINEURAL at 13:03

## 2024-07-29 RX ADMIN — FENTANYL CITRATE 50 MCG: 50 INJECTION INTRAMUSCULAR; INTRAVENOUS at 13:01

## 2024-07-29 RX ADMIN — SODIUM CHLORIDE, POTASSIUM CHLORIDE, SODIUM LACTATE AND CALCIUM CHLORIDE: 600; 310; 30; 20 INJECTION, SOLUTION INTRAVENOUS at 11:41

## 2024-07-29 ASSESSMENT — PAIN - FUNCTIONAL ASSESSMENT
PAIN_FUNCTIONAL_ASSESSMENT: 0-10
PAIN_FUNCTIONAL_ASSESSMENT: 0-10

## 2024-07-29 NOTE — H&P
Patient Name: Ellen Villa  : 1955  MRN: [id number]  DATE: 24    Allergies:   Allergies   Allergen Reactions    Citrus Hives and Other (See Comments)     \"Throat starts to close off\"    Elemental Sulfur Shortness Of Breath     Any Sulfur-\" can't breath\"    Ciprocinonide [Fluocinolone] Rash    Pineapple Itching and Swelling        ENDOSCOPY  History and Physical    Procedure:    [] Diagnostic Colonoscopy       [] Screening Colonoscopy  [x] EGD      [] ERCP      [] EUS       [] Other    [x] Previous office notes/History and Physical reviewed from the patients chart. Please see EMR for further details of HPI. I have examined the patient's status immediately prior to the procedure and:      Indications/HPI:      Dysphagia  Chronic GERD    []Abdominal Pain   []Cancer- GI/Lung     []Fhx of colon CA/polyps  []History of Polyps  []Barretts            []Melena  []Abnormal Imaging              [x]Dysphagia              []Persistent Pneumonia   []Anemia                            []Food Impaction        []History of Polyps  [] GI Bleed             []Pulmonary nodule/Mass   []Change in bowel habits []Heartburn/Reflux  []Rectal Bleed (BRBPR)  []Chest Pain - Non Cardiac []Heme (+) Stool []Ulcers  []Constipation  []Hemoptysis  []Varices  []Diarrhea  []Hypoxemia    []Nausea/Vomiting   []Screening   []Crohns/Colitis  []Other:     Anesthesia:   [x] MAC [] Moderate Sedation   [] General   [] None     ROS: 12 pt Review of Symptoms was negative unless mentioned above    Medications:   Prior to Admission medications    Medication Sig Start Date End Date Taking? Authorizing Provider   sodium chloride (OCEAN, BABY AYR) 0.65 % nasal spray 1 spray by Nasal route as needed for Congestion (OTC- saline nasle spray)    ProviderVerona MD   Cholecalciferol (VITAMIN D3) 50 MCG (2000) CAPS Take 1 capsule by mouth daily    ProviderVerona MD   mometasone (ELOCON) 0.1 % cream Apply small amount nightly to opening

## 2024-07-29 NOTE — ANESTHESIA POSTPROCEDURE EVALUATION
Department of Anesthesiology  Postprocedure Note    Patient: Ellen Villa  MRN: [id number]  YOB: 1955  Date of evaluation: 7/29/2024    Procedure Summary       Date: 07/29/24 Room / Location: [place] [id number] / [facility]    Anesthesia Start: 1259 Anesthesia Stop: 1310    Procedures:       ESOPHAGOGASTRODUODENOSCOPY BIOPSY (Abdomen)      ESOPHAGOGASTRODUODENOSCOPY DILATATION Garza Diagnosis:       Dysphagia, unspecified type      (Dysphagia, unspecified type [R13.10])    Surgeons: Graham Sanchez MD Responsible Provider: Brooklynn Guy APRN - CRNA    Anesthesia Type: General, TIVA ASA Status: 3            Anesthesia Type: General, TIVA    West Phase I:      West Phase II:      Anesthesia Post Evaluation    Patient location during evaluation: PACU  Patient participation: complete - patient participated  Level of consciousness: awake and alert  Pain score: 0  Airway patency: patent  Nausea & Vomiting: no nausea and no vomiting  Cardiovascular status: blood pressure returned to baseline  Respiratory status: acceptable, spontaneous ventilation, room air and nonlabored ventilation  Hydration status: euvolemic  Comments: BP (!) 183/90   Pulse 100   Temp 97.1 °F (36.2 °C) (Temporal)   Resp 20   Ht 1.702 m (5' 7\")   Wt (!) 137 kg (302 lb)   SpO2 91%   BMI 47.30 kg/m²     Pain management: adequate    No notable events documented.

## 2024-07-29 NOTE — DISCHARGE INSTRUCTIONS
.1.  Await path results, the patient will be contacted in 7-10 days with biopsy results.   2.  Magic mouthwash 5 ml PO Swish and swallow q3h PRN ONLY IF patient has post-procedural sorethroat or chest pain.  3. Full liquids to soft diet today laurie discharge from the surgicenter; may advance diet starting in AM tomorrow.  4. May resume other meds except any ASA/NSAIDs; may use cough drops or lozenges PRN; also continue meds for GERD with anti-GERD measures.  5. NO ASA/NSAIDs x 2 weeks  6. OP f/u in 6-8 weeks with Ms. Ponce; will consider an Esophageal manometry later if the patient's dysphagia persists.   NSAIDS (Non-steroidal Anti-Inflammatory)    You have been directed by your physician to avoid any NSAID's; the following medications are a list of those to avoid. If you think that you are taking any NSAID's notify your physician.     Over The Counter    Advil                      Motrin  Nuprin                   Ibuprofen  Midol                     Aleve  Naproxen              Orudis  Aspirin                   Sonya-Howell    Prescriptions and Generics    Cataflam              Relafen  Voltaren               Clinoril  Indocin                 Naproxen  Arthrotec              Lodine  Daypro                 Nalfon  Toradol                Ansaid  Feldene               Meclofenamate  Fenoprofen          Ponstel  Mobic                   Celebrex  Vioxx    Upper GI Endoscopy: What to Expect at Home  Your Recovery  You had an upper GI endoscopy. Your doctor used a thin, lighted tube that bends to look at the inside of your esophagus, your stomach, and the first part of the small intestine, called the duodenum.    How can you care for yourself at home?  Activity   Rest as much as you need to after you go home.  You should be able to go back to your usual activities the day after the test.  Due to anesthesia, no driving or operating equipment for 24 hours.  Diet   Follow your doctor's directions for eating after the

## 2024-07-29 NOTE — ANESTHESIA PRE PROCEDURE
Department of Anesthesiology  Preprocedure Note       Name:  Ellen Villa   Age:  68 y.o.  :  1955                                          MRN:  [id number]         Date:  2024      Surgeon: Surgeon(s):  Graham Sanchez MD    Procedure: Procedure(s):  ESOPHAGOGASTRODUODENOSCOPY BIOPSY    Medications prior to admission:   Prior to Admission medications    Medication Sig Start Date End Date Taking? Authorizing Provider   sodium chloride (OCEAN, BABY AYR) 0.65 % nasal spray 1 spray by Nasal route as needed for Congestion (OTC- saline nasle spray)    ProviderVerona MD   Cholecalciferol (VITAMIN D3) 50 MCG ( UT) CAPS Take 1 capsule by mouth daily    ProviderVerona MD   mometasone (ELOCON) 0.1 % cream Apply small amount nightly to opening of external ear canal.  Continue to use nightly until symptoms of dryness and itching improve.  Then may taper frequency of use to minimum necessary to control symptoms  Patient not taking: Reported on 2024   Kermit Greenwood PA-C   zinc sulfate (ZINCATE) 220 (50 Zn) MG capsule Take 1 capsule by mouth daily    ProviderVerona MD   aspirin 81 MG EC tablet Take 1 tablet by mouth daily    Verona Fisher MD   omeprazole (PRILOSEC) 20 MG delayed release capsule Take 1 capsule by mouth 2 times daily (before meals) 21   Kermit Greenwood PA-C   ibuprofen (ADVIL;MOTRIN) 800 MG tablet Take 1 tablet by mouth every 6 hours as needed for Pain 16   Rama Roland, APRN - CNP       Current medications:    Current Facility-Administered Medications   Medication Dose Route Frequency Provider Last Rate Last Admin   • lactated ringers IV soln infusion   IntraVENous Continuous Graham Sanchez  mL/hr at 24 1141 New Bag at 24 1141       Allergies:    Allergies   Allergen Reactions   • Citrus Hives and Other (See Comments)     \"Throat starts to close off\"   • Elemental Sulfur Shortness Of Breath     Any

## 2024-07-29 NOTE — OP NOTE
or mass lesions or extrinsic compression or diverticula noted. An empirical Garza 54 fr bougie dilation was performed and random cold biopsies were taken to check for EoE and NERD.     There is no obvious hiatal hernia present.      Stomach:  normal.    NO ulcers or masses or gastric outlet obstruction or retained food or fluid. Rugae were normal and lumen distended well with insufflation. Retroflexed views otherwise revealed a normal GE junction, fundus and cardia as well.       Duodenum: normal       RECOMMENDATIONS:    .1.  Await path results, the patient will be contacted in 7-10 days with biopsy results.   2.  Magic mouthwash 5 ml PO Swish and swallow q3h PRN ONLY IF patient has post-procedural sorethroat or chest pain.  3. Full liquids to soft diet today laurie discharge from the surgicenter; may advance diet starting in AM tomorrow.  4. May resume other meds except any ASA/NSAIDs; may use cough drops or lozenges PRN; also continue meds for GERD with anti-GERD measures.  5. NO ASA/NSAIDs x 2 weeks  6. OP f/u in 6-8 weeks with Ms. Ponce; will consider an Esophageal manometry later if the patient's dysphagia persists.     The results were discussed with the patient and family.  A copy of the images obtained were given to the patient.     (Please note that portions of this note were completed with a voice recognition program. Efforts were made to edit the dictations but occasionally words may be mis-transcribed.)     Graham Sanchez MD, MD  7/29/2024  12:48 PM

## 2024-09-09 ENCOUNTER — OFFICE VISIT (OUTPATIENT)
Dept: GASTROENTEROLOGY | Age: 69
End: 2024-09-09
Payer: MEDICARE

## 2024-09-09 VITALS
SYSTOLIC BLOOD PRESSURE: 160 MMHG | WEIGHT: 293 LBS | BODY MASS INDEX: 44.41 KG/M2 | HEART RATE: 117 BPM | HEIGHT: 68 IN | DIASTOLIC BLOOD PRESSURE: 60 MMHG | OXYGEN SATURATION: 94 %

## 2024-09-09 DIAGNOSIS — K22.10 EROSIVE ESOPHAGITIS: Primary | ICD-10-CM

## 2024-09-09 PROCEDURE — 1090F PRES/ABSN URINE INCON ASSESS: CPT | Performed by: NURSE PRACTITIONER

## 2024-09-09 PROCEDURE — G8400 PT W/DXA NO RESULTS DOC: HCPCS | Performed by: NURSE PRACTITIONER

## 2024-09-09 PROCEDURE — G8417 CALC BMI ABV UP PARAM F/U: HCPCS | Performed by: NURSE PRACTITIONER

## 2024-09-09 PROCEDURE — 1123F ACP DISCUSS/DSCN MKR DOCD: CPT | Performed by: NURSE PRACTITIONER

## 2024-09-09 PROCEDURE — G8427 DOCREV CUR MEDS BY ELIG CLIN: HCPCS | Performed by: NURSE PRACTITIONER

## 2024-09-09 PROCEDURE — 99214 OFFICE O/P EST MOD 30 MIN: CPT | Performed by: NURSE PRACTITIONER

## 2024-09-09 PROCEDURE — 1036F TOBACCO NON-USER: CPT | Performed by: NURSE PRACTITIONER

## 2024-09-09 PROCEDURE — 3017F COLORECTAL CA SCREEN DOC REV: CPT | Performed by: NURSE PRACTITIONER

## 2024-09-09 ASSESSMENT — ENCOUNTER SYMPTOMS
SHORTNESS OF BREATH: 0
BLOOD IN STOOL: 0
NAUSEA: 0
ABDOMINAL DISTENTION: 0
ABDOMINAL PAIN: 0
RECTAL PAIN: 0
CHOKING: 0
ANAL BLEEDING: 0
VOMITING: 0
COUGH: 0
DIARRHEA: 0
TROUBLE SWALLOWING: 0
CONSTIPATION: 0

## 2024-09-16 ENCOUNTER — HOSPITAL ENCOUNTER (OUTPATIENT)
Dept: ULTRASOUND IMAGING | Age: 69
Discharge: HOME | End: 2024-09-16
Payer: MEDICARE

## 2024-09-16 ENCOUNTER — OFFICE VISIT (OUTPATIENT)
Dept: ENT CLINIC | Age: 69
End: 2024-09-16
Payer: MEDICARE

## 2024-09-16 VITALS
SYSTOLIC BLOOD PRESSURE: 142 MMHG | BODY MASS INDEX: 44.41 KG/M2 | HEIGHT: 68 IN | WEIGHT: 293 LBS | DIASTOLIC BLOOD PRESSURE: 90 MMHG

## 2024-09-16 DIAGNOSIS — E04.2 MULTINODULAR THYROID: ICD-10-CM

## 2024-09-16 DIAGNOSIS — E04.2 MULTINODULAR THYROID: Primary | ICD-10-CM

## 2024-09-16 DIAGNOSIS — K21.9 LARYNGOPHARYNGEAL REFLUX (LPR): ICD-10-CM

## 2024-09-16 PROCEDURE — 99213 OFFICE O/P EST LOW 20 MIN: CPT | Performed by: PHYSICIAN ASSISTANT

## 2024-09-16 PROCEDURE — 76536 US EXAM OF HEAD AND NECK: CPT

## 2024-09-16 PROCEDURE — G8427 DOCREV CUR MEDS BY ELIG CLIN: HCPCS | Performed by: PHYSICIAN ASSISTANT

## 2024-09-16 PROCEDURE — G8400 PT W/DXA NO RESULTS DOC: HCPCS | Performed by: PHYSICIAN ASSISTANT

## 2024-09-16 PROCEDURE — 1123F ACP DISCUSS/DSCN MKR DOCD: CPT | Performed by: PHYSICIAN ASSISTANT

## 2024-09-16 PROCEDURE — 1090F PRES/ABSN URINE INCON ASSESS: CPT | Performed by: PHYSICIAN ASSISTANT

## 2024-09-16 PROCEDURE — 3017F COLORECTAL CA SCREEN DOC REV: CPT | Performed by: PHYSICIAN ASSISTANT

## 2024-09-16 PROCEDURE — 1036F TOBACCO NON-USER: CPT | Performed by: PHYSICIAN ASSISTANT

## 2024-09-16 PROCEDURE — G8417 CALC BMI ABV UP PARAM F/U: HCPCS | Performed by: PHYSICIAN ASSISTANT

## 2024-09-16 RX ORDER — OMEPRAZOLE 40 MG/1
40 CAPSULE, DELAYED RELEASE ORAL 2 TIMES DAILY
Qty: 60 CAPSULE | Refills: 2 | Status: SHIPPED | OUTPATIENT
Start: 2024-09-16

## 2024-09-16 ASSESSMENT — ENCOUNTER SYMPTOMS
FACIAL SWELLING: 0
SINUS PAIN: 0
VOICE CHANGE: 0
TROUBLE SWALLOWING: 0
RHINORRHEA: 0
SORE THROAT: 0
SINUS PRESSURE: 0
EYE PAIN: 0
EYE DISCHARGE: 0

## 2024-10-02 ENCOUNTER — TELEPHONE (OUTPATIENT)
Dept: ENT CLINIC | Age: 69
End: 2024-10-02

## 2024-10-02 DIAGNOSIS — E04.2 MULTINODULAR THYROID: Primary | ICD-10-CM

## 2024-10-02 NOTE — TELEPHONE ENCOUNTER
US of the thyroid results were called to the patient.  This demonstrated no remarkable changes with the patient having a previous biopsy of the right nodule.  I recommended following up with an ultrasound in 1 year with repeat physical exam in the clinic.  She was reminded to call if she has any questions or concerns.    Electronically signed by CATHERINE BANEGAS PA-C on 10/2/24 at 6:42 PM CDT

## 2025-03-05 ENCOUNTER — OFFICE VISIT (OUTPATIENT)
Dept: ENT CLINIC | Age: 70
End: 2025-03-05
Payer: MEDICARE

## 2025-03-05 VITALS
HEIGHT: 68 IN | SYSTOLIC BLOOD PRESSURE: 180 MMHG | BODY MASS INDEX: 44.25 KG/M2 | DIASTOLIC BLOOD PRESSURE: 98 MMHG | WEIGHT: 292 LBS

## 2025-03-05 DIAGNOSIS — K11.20 PAROTIDITIS: Primary | ICD-10-CM

## 2025-03-05 PROBLEM — H61.23 BILATERAL IMPACTED CERUMEN: Status: RESOLVED | Noted: 2021-11-05 | Resolved: 2025-03-05

## 2025-03-05 PROBLEM — H65.92 LEFT NON-SUPPURATIVE OTITIS MEDIA: Status: RESOLVED | Noted: 2022-04-28 | Resolved: 2025-03-05

## 2025-03-05 PROBLEM — K21.9 LARYNGOPHARYNGEAL REFLUX (LPR): Status: RESOLVED | Noted: 2021-11-05 | Resolved: 2025-03-05

## 2025-03-05 PROBLEM — J01.01 ACUTE RECURRENT MAXILLARY SINUSITIS: Status: RESOLVED | Noted: 2022-04-28 | Resolved: 2025-03-05

## 2025-03-05 PROCEDURE — 1090F PRES/ABSN URINE INCON ASSESS: CPT | Performed by: PHYSICIAN ASSISTANT

## 2025-03-05 PROCEDURE — 99213 OFFICE O/P EST LOW 20 MIN: CPT | Performed by: PHYSICIAN ASSISTANT

## 2025-03-05 PROCEDURE — G8400 PT W/DXA NO RESULTS DOC: HCPCS | Performed by: PHYSICIAN ASSISTANT

## 2025-03-05 PROCEDURE — 1123F ACP DISCUSS/DSCN MKR DOCD: CPT | Performed by: PHYSICIAN ASSISTANT

## 2025-03-05 PROCEDURE — 1160F RVW MEDS BY RX/DR IN RCRD: CPT | Performed by: PHYSICIAN ASSISTANT

## 2025-03-05 PROCEDURE — 1159F MED LIST DOCD IN RCRD: CPT | Performed by: PHYSICIAN ASSISTANT

## 2025-03-05 PROCEDURE — 1036F TOBACCO NON-USER: CPT | Performed by: PHYSICIAN ASSISTANT

## 2025-03-05 PROCEDURE — G8427 DOCREV CUR MEDS BY ELIG CLIN: HCPCS | Performed by: PHYSICIAN ASSISTANT

## 2025-03-05 PROCEDURE — 3017F COLORECTAL CA SCREEN DOC REV: CPT | Performed by: PHYSICIAN ASSISTANT

## 2025-03-05 PROCEDURE — G8417 CALC BMI ABV UP PARAM F/U: HCPCS | Performed by: PHYSICIAN ASSISTANT

## 2025-03-05 ASSESSMENT — ENCOUNTER SYMPTOMS
FACIAL SWELLING: 0
SORE THROAT: 0
RHINORRHEA: 0
VOICE CHANGE: 0
SINUS PAIN: 0
TROUBLE SWALLOWING: 0
EYE PAIN: 0
EYE DISCHARGE: 0
SINUS PRESSURE: 0

## 2025-03-05 NOTE — ASSESSMENT & PLAN NOTE
Right parotiditis  Plan: I will place the patient on a 2-week course of Augmentin.  Patient is allergic to citrus therefore will try warm moist heating pad to the surface.  She was encouraged to drink hot liquids to help stimulate the salivary duct.  She is to follow-up with me in 3 weeks for reevaluation.

## 2025-03-05 NOTE — PROGRESS NOTES
[facility] OTOLARYNGOLOGY/ENT  Ellen is a pleasant 69-year-old  female that presents to the clinic with complaints of a palpable mass to the right anterior neck.  She reports this has been present for about a month or 2 and seems to be worsening.  She has also noticed decreased salivation making it difficult for her to swallow at times.  Patient has a history of a multinodular thyroid and she is concerned that this may be related to her thyroid.  Denies any issues with fever or chills.        Allergies: Citrus, Elemental sulfur, Ciprocinonide [fluocinolone], and Pineapple      Current Outpatient Medications   Medication Sig Dispense Refill    amoxicillin-clavulanate (AUGMENTIN) 875-125 MG per tablet Take 1 tablet by mouth 2 times daily for 14 days 28 tablet 0    omeprazole (PRILOSEC) 40 MG delayed release capsule Take 1 capsule by mouth in the morning and 1 capsule in the evening. 60 capsule 2    sodium chloride (OCEAN, BABY AYR) 0.65 % nasal spray 1 spray by Nasal route as needed for Congestion (OTC- saline nasle spray)      Cholecalciferol (VITAMIN D3) 50 MCG (2000 UT) CAPS Take 1 capsule by mouth daily      mometasone (ELOCON) 0.1 % cream Apply small amount nightly to opening of external ear canal.  Continue to use nightly until symptoms of dryness and itching improve.  Then may taper frequency of use to minimum necessary to control symptoms (Patient not taking: Reported on 7/29/2024) 15 g 2    zinc sulfate (ZINCATE) 220 (50 Zn) MG capsule Take 1 capsule by mouth daily      aspirin 81 MG EC tablet Take 1 tablet by mouth daily      ibuprofen (ADVIL;MOTRIN) 800 MG tablet Take 1 tablet by mouth every 6 hours as needed for Pain 120 tablet 3     No current facility-administered medications for this visit.       Past Surgical History:   Procedure Laterality Date    EYE SURGERY Right     retinal tear repair    FOOT SURGERY Left     tendon surgery x 2    SHAZIA AND BSO (CERVIX REMOVED) N/A 12/30/2015

## 2025-03-26 ENCOUNTER — OFFICE VISIT (OUTPATIENT)
Dept: ENT CLINIC | Age: 70
End: 2025-03-26
Payer: MEDICARE

## 2025-03-26 VITALS
SYSTOLIC BLOOD PRESSURE: 200 MMHG | BODY MASS INDEX: 44.25 KG/M2 | DIASTOLIC BLOOD PRESSURE: 120 MMHG | WEIGHT: 292 LBS | HEIGHT: 68 IN

## 2025-03-26 DIAGNOSIS — K11.20 PAROTIDITIS: Primary | ICD-10-CM

## 2025-03-26 PROCEDURE — 99213 OFFICE O/P EST LOW 20 MIN: CPT | Performed by: PHYSICIAN ASSISTANT

## 2025-03-26 PROCEDURE — G8400 PT W/DXA NO RESULTS DOC: HCPCS | Performed by: PHYSICIAN ASSISTANT

## 2025-03-26 PROCEDURE — G8427 DOCREV CUR MEDS BY ELIG CLIN: HCPCS | Performed by: PHYSICIAN ASSISTANT

## 2025-03-26 PROCEDURE — 1036F TOBACCO NON-USER: CPT | Performed by: PHYSICIAN ASSISTANT

## 2025-03-26 PROCEDURE — 1123F ACP DISCUSS/DSCN MKR DOCD: CPT | Performed by: PHYSICIAN ASSISTANT

## 2025-03-26 PROCEDURE — 1159F MED LIST DOCD IN RCRD: CPT | Performed by: PHYSICIAN ASSISTANT

## 2025-03-26 PROCEDURE — 1160F RVW MEDS BY RX/DR IN RCRD: CPT | Performed by: PHYSICIAN ASSISTANT

## 2025-03-26 PROCEDURE — 3017F COLORECTAL CA SCREEN DOC REV: CPT | Performed by: PHYSICIAN ASSISTANT

## 2025-03-26 PROCEDURE — G8417 CALC BMI ABV UP PARAM F/U: HCPCS | Performed by: PHYSICIAN ASSISTANT

## 2025-03-26 PROCEDURE — 1090F PRES/ABSN URINE INCON ASSESS: CPT | Performed by: PHYSICIAN ASSISTANT

## 2025-03-26 NOTE — PROGRESS NOTES
Ellen is a pleasant 69-year-old  female that presents for a 1 month follow-up after treatment for right sided parotiditis.  She reports after completing the antibiotics the swelling has improved.  She is still noted with some mild tenderness.  She admits to having chronic issues with having a dry mouth but has not noticing this getting worse.      Physical examination revealed significant reduction of the enlarged right parotid gland.  Firmness is noted measuring about 2 cm in size and is fairly nontender.  No lymphadenopathy was noted.  No thyroid changes were grossly appreciated.  Oral exam demonstrated no abnormalities to the posterior pharynx with the salivary duct noted be nondilated sublingually.      Impression: Clinically resolving right parotiditis    Plan: I will have the patient to follow-up in 2 months for reevaluation.  She was reminded to call if she has recurrent symptoms or has questions.      Electronically signed by CATHERINE BANEGAS PA-C on 3/26/25 at 1:17 PM CDT

## 2025-05-27 ENCOUNTER — OFFICE VISIT (OUTPATIENT)
Dept: ENT CLINIC | Age: 70
End: 2025-05-27
Payer: MEDICARE

## 2025-05-27 VITALS
WEIGHT: 293 LBS | SYSTOLIC BLOOD PRESSURE: 160 MMHG | DIASTOLIC BLOOD PRESSURE: 80 MMHG | BODY MASS INDEX: 44.41 KG/M2 | HEIGHT: 68 IN

## 2025-05-27 DIAGNOSIS — Z22.322 MRSA NASAL COLONIZATION: ICD-10-CM

## 2025-05-27 DIAGNOSIS — J32.0 LEFT MAXILLARY SINUSITIS: Primary | ICD-10-CM

## 2025-05-27 PROCEDURE — G8400 PT W/DXA NO RESULTS DOC: HCPCS | Performed by: PHYSICIAN ASSISTANT

## 2025-05-27 PROCEDURE — 1036F TOBACCO NON-USER: CPT | Performed by: PHYSICIAN ASSISTANT

## 2025-05-27 PROCEDURE — 3017F COLORECTAL CA SCREEN DOC REV: CPT | Performed by: PHYSICIAN ASSISTANT

## 2025-05-27 PROCEDURE — 1090F PRES/ABSN URINE INCON ASSESS: CPT | Performed by: PHYSICIAN ASSISTANT

## 2025-05-27 PROCEDURE — 1123F ACP DISCUSS/DSCN MKR DOCD: CPT | Performed by: PHYSICIAN ASSISTANT

## 2025-05-27 PROCEDURE — 99213 OFFICE O/P EST LOW 20 MIN: CPT | Performed by: PHYSICIAN ASSISTANT

## 2025-05-27 PROCEDURE — G8427 DOCREV CUR MEDS BY ELIG CLIN: HCPCS | Performed by: PHYSICIAN ASSISTANT

## 2025-05-27 PROCEDURE — G8417 CALC BMI ABV UP PARAM F/U: HCPCS | Performed by: PHYSICIAN ASSISTANT

## 2025-05-27 PROCEDURE — 1159F MED LIST DOCD IN RCRD: CPT | Performed by: PHYSICIAN ASSISTANT

## 2025-05-27 PROCEDURE — 1160F RVW MEDS BY RX/DR IN RCRD: CPT | Performed by: PHYSICIAN ASSISTANT

## 2025-05-27 RX ORDER — MUPIROCIN 20 MG/G
OINTMENT TOPICAL
Qty: 15 G | Refills: 1 | Status: SHIPPED | OUTPATIENT
Start: 2025-05-27 | End: 2025-06-03

## 2025-05-27 RX ORDER — ACETAMINOPHEN 500 MG
500 TABLET ORAL EVERY 6 HOURS PRN
COMMUNITY

## 2025-05-27 RX ORDER — SULFAMETHOXAZOLE AND TRIMETHOPRIM 800; 160 MG/1; MG/1
1 TABLET ORAL 2 TIMES DAILY
Qty: 20 TABLET | Refills: 0 | Status: SHIPPED | OUTPATIENT
Start: 2025-05-27 | End: 2025-06-06

## 2025-05-27 NOTE — PROGRESS NOTES
Ellen is a pleasant 69-year-old  female that presents for 2-month follow-up due to sporadic parotiditis.  She reports that the parotid gland has been stable with no major changes.  She does report experiencing 2 episodes of a nosebleed and is requesting evaluation.  Also complains of worsening left maxillary and left frontal sinus pressure with no fever or chills.      Physical examination demonstrated the patient have normal TMs and canals bilaterally.  Patient was noted with positive left maxillary and left frontal sinus tenderness with the right side being negative.  Nasal exam demonstrated no evidence of a nasal septal ulceration bilaterally.  Patient was noted to have evidence of microabscesses to the left anterior left nostril consistent with probable staph infection.  No additional findings were noted posteriorly.  Oral exam demonstrated no evidence of bleeding or masses to the posterior pharynx.  Neck exam demonstrated some tenderness along the left anterior cervical chain with no lymphadenopathy noted.  The parotid gland is mildly enlarged on the left side with no tenderness appreciated.      Impression: Acute left maxillary sinusitis, history of epistaxis likely secondary to staph, stable parotiditis likely from parotid stones based on history    Plan: I recommended a 10-day course of Bactrim DS as well as mupirocin ointment to the nostrils.  I will have her follow with me in 2 months unless her symptoms persist.  She was reminded to call if she has any questions or concerns.      Electronically signed by CATHERINE BANEGAS PA-C on 5/27/25 at 11:16 AM ACT

## 2025-06-25 RX ORDER — CLINDAMYCIN HYDROCHLORIDE 300 MG/1
300 CAPSULE ORAL 3 TIMES DAILY
Qty: 30 CAPSULE | Refills: 0 | Status: SHIPPED | OUTPATIENT
Start: 2025-06-25 | End: 2025-07-05

## 2025-06-25 NOTE — TELEPHONE ENCOUNTER
Patient was allergic to sulfa therefore the Bactrim was discontinued and she was given a 10-day course of clindamycin.    Electronically signed by CATHERINE BANEGAS PA-C on 6/25/25 at 11:36 AM ACT

## 2025-07-29 ENCOUNTER — OFFICE VISIT (OUTPATIENT)
Dept: ENT CLINIC | Age: 70
End: 2025-07-29
Payer: MEDICARE

## 2025-07-29 VITALS
WEIGHT: 293 LBS | SYSTOLIC BLOOD PRESSURE: 142 MMHG | HEIGHT: 68 IN | BODY MASS INDEX: 44.41 KG/M2 | DIASTOLIC BLOOD PRESSURE: 82 MMHG

## 2025-07-29 DIAGNOSIS — H61.23 BILATERAL IMPACTED CERUMEN: Primary | ICD-10-CM

## 2025-07-29 PROCEDURE — 69210 REMOVE IMPACTED EAR WAX UNI: CPT | Performed by: PHYSICIAN ASSISTANT

## 2025-07-29 NOTE — PROGRESS NOTES
Alison is a pleasant 69-year-old  female that presents for a 3-month cerumen check.  She reports that she has noticed no obvious hearing changes but does admit to itching of the canals.  She also reports that her dysphagia seems to be stable with no major issues.      Physical examination with the microscope confirmed bilateral cerumen impactions to be present.  This was successfully removed with alligator graspers and suction with no complications.  After disimpaction, the TMs appeared to be normal.  Neck exam demonstrated no lymphadenopathy or thyromegaly.  No thyroid nodules were grossly noted to palpation.  Oral exam demonstrated no masses to the posterior pharynx with normal surface of tongue.      Impression: Successful cerumen disimpaction with microscopy and instrumentation-bilateral      Plan: Patient is to follow-up in 3 months for cerumen check.  She was reminded to call if she has any questions or concerns.      Electronically signed by CATHERINE BANEGAS PA-C on 7/29/25 at 12:46 PM CDT

## (undated) DEVICE — FORCEPS BX L240CM JAW DIA2.4MM ORNG L CAP W/ NDL DISP RAD

## (undated) DEVICE — ENDO KIT,LOURDES HOSPITAL: Brand: MEDLINE INDUSTRIES, INC.